# Patient Record
Sex: FEMALE | Race: WHITE | Employment: OTHER | URBAN - METROPOLITAN AREA
[De-identification: names, ages, dates, MRNs, and addresses within clinical notes are randomized per-mention and may not be internally consistent; named-entity substitution may affect disease eponyms.]

---

## 2018-04-24 ENCOUNTER — OFFICE VISIT (OUTPATIENT)
Dept: FAMILY MEDICINE CLINIC | Facility: CLINIC | Age: 58
End: 2018-04-24

## 2018-04-24 VITALS
BODY MASS INDEX: 35 KG/M2 | OXYGEN SATURATION: 98 % | HEART RATE: 62 BPM | HEIGHT: 64 IN | RESPIRATION RATE: 16 BRPM | DIASTOLIC BLOOD PRESSURE: 76 MMHG | SYSTOLIC BLOOD PRESSURE: 136 MMHG | TEMPERATURE: 98 F | WEIGHT: 205 LBS

## 2018-04-24 DIAGNOSIS — H69.81 ETD (EUSTACHIAN TUBE DYSFUNCTION), RIGHT: ICD-10-CM

## 2018-04-24 DIAGNOSIS — H81.311: Primary | ICD-10-CM

## 2018-04-24 DIAGNOSIS — J30.2 SEASONAL ALLERGIC RHINITIS, UNSPECIFIED TRIGGER: ICD-10-CM

## 2018-04-24 PROCEDURE — 99213 OFFICE O/P EST LOW 20 MIN: CPT | Performed by: PHYSICIAN ASSISTANT

## 2018-04-24 RX ORDER — FLUTICASONE PROPIONATE 50 MCG
2 SPRAY, SUSPENSION (ML) NASAL DAILY
Qty: 1 BOTTLE | Refills: 0 | Status: SHIPPED | OUTPATIENT
Start: 2018-04-24 | End: 2021-03-03

## 2018-04-24 RX ORDER — MECLIZINE HYDROCHLORIDE 25 MG/1
25 TABLET ORAL 3 TIMES DAILY PRN
Qty: 30 TABLET | Refills: 0 | Status: SHIPPED | OUTPATIENT
Start: 2018-04-24 | End: 2021-03-03

## 2018-04-24 RX ORDER — PREDNISONE 1 MG/1
5 TABLET ORAL DAILY
COMMUNITY
End: 2021-03-03

## 2018-04-24 NOTE — PROGRESS NOTES
CHIEF COMPLAINT:     Patient presents with:  Dizziness: since Sunday, started with HA, intermittent congestion  :      HPI:     Nelson Fraga is a 61year old female presents with complaints of dizziness. Patient has had symptoms for 2-3.  Patient has has HENT: Denies facial weakness, sore throat, No diminished hearing, + aural fullness, No  tinnitus.   CHEST: Denies chest pain, or palpitations  LUNGS: Denies shortness of breath, cough, or wheezing  GI: Denies abdominal pain, Mild nausea   NEURO: + sinus hea Sig: Take 1 tablet (25 mg total) by mouth 3 (three) times daily as needed for Dizziness or Nausea. Fluticasone Propionate (FLONASE) 50 MCG/ACT Nasal Suspension 1 Bottle 0      Si sprays by Each Nare route daily.          Risks, benefits, side ef Driving  If you become dizzy or disoriented while driving, you could hurt yourself and others. That's why it's best to not drive until symptoms have gone away. In some cases, your license may be temporarily held until it's safe for you to drive again.   For © 5727-8388 [employer] [address] [facility], [facility] [place]. All rights reserved. This information is not intended as a substitute for professional medical care. Always follow your healthcare professional's instructions.             The

## 2018-04-24 NOTE — PATIENT INSTRUCTIONS
Dizziness (Vertigo) and Balance Problems: Staying Safe     Replace burned-out lightbulbs to keep your home safe and well lit. Falls or accidents can lead to pain, broken bones, and fear of future falls.  Protect yourself and others by preparing for epis · Take public transportation. · Walk to stores and other places when you can. Asking for help  Don't be afraid to ask for help running errands, cooking meals, and doing exercise.  Whether it's a friend, loved one, neighbor, or stranger on the street, avis downing

## 2018-05-21 ENCOUNTER — OFFICE VISIT (OUTPATIENT)
Dept: OTOLARYNGOLOGY | Facility: CLINIC | Age: 58
End: 2018-05-21

## 2018-05-21 ENCOUNTER — OFFICE VISIT (OUTPATIENT)
Dept: AUDIOLOGY | Facility: CLINIC | Age: 58
End: 2018-05-21

## 2018-05-21 VITALS
WEIGHT: 205 LBS | BODY MASS INDEX: 35 KG/M2 | TEMPERATURE: 97 F | DIASTOLIC BLOOD PRESSURE: 86 MMHG | HEIGHT: 64 IN | SYSTOLIC BLOOD PRESSURE: 172 MMHG

## 2018-05-21 DIAGNOSIS — T16.1XXA FOREIGN BODY OF RIGHT EAR, INITIAL ENCOUNTER: ICD-10-CM

## 2018-05-21 DIAGNOSIS — R42 DIZZINESS: Primary | ICD-10-CM

## 2018-05-21 DIAGNOSIS — J01.00 SUBACUTE MAXILLARY SINUSITIS: ICD-10-CM

## 2018-05-21 PROCEDURE — 92567 TYMPANOMETRY: CPT | Performed by: AUDIOLOGIST

## 2018-05-21 PROCEDURE — 99212 OFFICE O/P EST SF 10 MIN: CPT | Performed by: OTOLARYNGOLOGY

## 2018-05-21 PROCEDURE — 99243 OFF/OP CNSLTJ NEW/EST LOW 30: CPT | Performed by: OTOLARYNGOLOGY

## 2018-05-21 PROCEDURE — 69200 CLEAR OUTER EAR CANAL: CPT | Performed by: OTOLARYNGOLOGY

## 2018-05-21 PROCEDURE — 92557 COMPREHENSIVE HEARING TEST: CPT | Performed by: AUDIOLOGIST

## 2018-05-21 RX ORDER — AMOXICILLIN AND CLAVULANATE POTASSIUM 875; 125 MG/1; MG/1
1 TABLET, FILM COATED ORAL EVERY 12 HOURS
Qty: 28 TABLET | Refills: 0 | Status: SHIPPED | OUTPATIENT
Start: 2018-05-21 | End: 2018-06-04

## 2018-05-21 NOTE — PROGRESS NOTES
Stan Mayorga is a 61year old female. Patient presents with:  Dizziness: dizziness for 3  weeks  Sinus Problem: sinus congestion, clogged right ear for 3 weeks      HISTORY OF PRESENT ILLNESS  5/21/2018  The patient has complaints of dizziness.   She has family history on file.     Past Medical History:   Diagnosis Date   • Acid reflux    • Asthma    • Rheumatoid arthritis ([facility] [address]    • Vertigo        Past Surgical History:  No date: BREAST SURGERY  No date: TONSILLECTOMY      REVIEW OF SYSTEMS    System Neg/P Nose/Mouth/Throat Normal External nose - Normal. Lips/teeth/gums - Normal. Tonsils nl. uvula is midline.  Tongue is normal with  Normal mobility and mucosa is free of lesions Oropharynx -cobblestoning   Nose/Mouth/Throat abNormal Nares - Right: Norm as: hearing loss, pain, double vision, numbness or weakness or other cranial nerve issues I did not recommend an MRI at this time. I recommend rubin's exercises and followup if this persists or she has any new symptoms.  I also discussed other causes fo

## 2018-05-21 NOTE — PROGRESS NOTES
AUDIOGRAM     Kevin Cabrera was referred for testing by Marilu Roberto for clogged right ear and dizziness. 8/8/1960  [id number]    Otoscopic inspection: right ear no cerumen; left ear no cerumen.        Tests/Procedures  Patient was tested via  s

## 2018-05-30 ENCOUNTER — TELEPHONE (OUTPATIENT)
Dept: OTOLARYNGOLOGY | Facility: CLINIC | Age: 58
End: 2018-05-30

## 2018-05-30 DIAGNOSIS — R26.89 IMBALANCE: Primary | ICD-10-CM

## 2018-05-30 NOTE — TELEPHONE ENCOUNTER
pt informed of note below, pt states she is feeling nauseated, pt feels her symptoms are due to inner ear issues and states she is doing exercises already, pt states she feels she is having drainage from right ear-clear in color and right ear fee

## 2018-05-30 NOTE — TELEPHONE ENCOUNTER
ROBEL.     Per LEATHA:  I saw her  in the office for an appt and stated his wife wants to know what to do becuase she has persistant symptoms Reccomend physical therapy for imbalance neurology consult if not improved     Order entered for PT Estrada Vyas

## 2020-01-13 ENCOUNTER — TELEPHONE (OUTPATIENT)
Dept: INTERNAL MEDICINE CLINIC | Facility: CLINIC | Age: 60
End: 2020-01-13

## 2020-01-13 NOTE — TELEPHONE ENCOUNTER
PCP removal form completed. Patient currently seeing Dr. Bonnie Haddad at [facility] as a PCP.

## 2021-03-03 PROBLEM — E66.09 CLASS 2 OBESITY DUE TO EXCESS CALORIES WITHOUT SERIOUS COMORBIDITY WITH BODY MASS INDEX (BMI) OF 38.0 TO 38.9 IN ADULT: Status: ACTIVE | Noted: 2021-03-03

## 2021-03-03 PROBLEM — I10 ESSENTIAL HYPERTENSION: Status: ACTIVE | Noted: 2021-03-03

## 2021-03-03 PROBLEM — E66.812 CLASS 2 OBESITY DUE TO EXCESS CALORIES WITHOUT SERIOUS COMORBIDITY WITH BODY MASS INDEX (BMI) OF 38.0 TO 38.9 IN ADULT: Status: ACTIVE | Noted: 2021-03-03

## 2021-03-03 PROBLEM — E78.5 HYPERLIPIDEMIA, UNSPECIFIED HYPERLIPIDEMIA TYPE: Status: ACTIVE | Noted: 2021-03-03

## 2021-03-03 PROBLEM — M17.0 BILATERAL PRIMARY OSTEOARTHRITIS OF KNEE: Status: ACTIVE | Noted: 2021-03-03

## 2021-09-01 PROBLEM — I89.0 LYMPHEDEMA: Status: ACTIVE | Noted: 2021-09-01

## 2021-09-01 PROBLEM — E78.5 HYPERLIPIDEMIA, UNSPECIFIED HYPERLIPIDEMIA TYPE: Status: RESOLVED | Noted: 2021-03-03 | Resolved: 2021-09-01

## 2022-03-11 PROBLEM — J45.20 MILD INTERMITTENT ASTHMA WITHOUT COMPLICATION: Status: ACTIVE | Noted: 2022-03-11

## 2022-03-11 PROBLEM — J45.20 MILD INTERMITTENT ASTHMA WITHOUT COMPLICATION (HCC): Status: ACTIVE | Noted: 2022-03-11

## 2022-03-11 PROBLEM — M72.2 PLANTAR FASCIITIS, BILATERAL: Status: ACTIVE | Noted: 2022-03-11

## 2022-03-11 PROBLEM — R73.03 PREDIABETES: Status: ACTIVE | Noted: 2022-03-11

## 2023-07-11 ENCOUNTER — HOSPITAL ENCOUNTER (OUTPATIENT)
Dept: NUCLEAR MEDICINE | Facility: HOSPITAL | Age: 63
Discharge: HOME | End: 2023-07-11
Attending: INTERNAL MEDICINE
Payer: COMMERCIAL

## 2023-07-11 DIAGNOSIS — M79.604 RIGHT LEG PAIN: ICD-10-CM

## 2023-07-11 DIAGNOSIS — M79.605 LEFT LEG PAIN: ICD-10-CM

## 2023-07-11 PROCEDURE — 78315 BONE IMAGING 3 PHASE: CPT | Performed by: INTERNAL MEDICINE

## 2023-07-28 ENCOUNTER — HOSPITAL ENCOUNTER (OUTPATIENT)
Dept: CV DIAGNOSTICS | Facility: HOSPITAL | Age: 63
Discharge: HOME | End: 2023-07-28
Attending: INTERNAL MEDICINE
Payer: COMMERCIAL

## 2023-07-28 DIAGNOSIS — R60.9 EDEMA: ICD-10-CM

## 2023-07-28 PROCEDURE — 93306 TTE W/DOPPLER COMPLETE: CPT | Performed by: INTERNAL MEDICINE

## 2024-01-18 ENCOUNTER — TELEPHONE (OUTPATIENT)
Dept: FAMILY MEDICINE CLINIC | Facility: CLINIC | Age: 64
End: 2024-01-18

## 2024-01-18 DIAGNOSIS — E87.6 HYPOKALEMIA: Primary | ICD-10-CM

## 2024-01-18 NOTE — TELEPHONE ENCOUNTER
LTKA on 02/09/24 with Dr. Behery at [facility]    H&P- Completed 01/19/24  Labs- potassium (2.9), sed rate (51), MRSA neg, Urine Culture neg, all other labs WNL  EKG- WNL  X-ray- no pneumonia, pleural effusion, or pneumothorax    Spoke to [facility] lab- potassium drawn today was 2.9. Per MARCIA have patient take Klor-Con 10meq daily #30 with one refill. Patient to have BMP Monday 01/22/24. Order put in [employer]. Rx sent to pharmacy.

## 2024-01-19 ENCOUNTER — HOSPITAL ENCOUNTER (OUTPATIENT)
Dept: GENERAL RADIOLOGY | Facility: HOSPITAL | Age: 64
Discharge: HOME | End: 2024-01-19
Attending: FAMILY MEDICINE
Payer: COMMERCIAL

## 2024-01-19 ENCOUNTER — LAB ENCOUNTER (OUTPATIENT)
Dept: LAB | Facility: HOSPITAL | Age: 64
End: 2024-01-19
Attending: FAMILY MEDICINE
Payer: COMMERCIAL

## 2024-01-19 ENCOUNTER — OFFICE VISIT (OUTPATIENT)
Dept: FAMILY MEDICINE CLINIC | Facility: CLINIC | Age: 64
End: 2024-01-19
Payer: COMMERCIAL

## 2024-01-19 VITALS
SYSTOLIC BLOOD PRESSURE: 140 MMHG | BODY MASS INDEX: 36.4 KG/M2 | OXYGEN SATURATION: 99 % | HEART RATE: 78 BPM | TEMPERATURE: 97 F | DIASTOLIC BLOOD PRESSURE: 72 MMHG | RESPIRATION RATE: 16 BRPM | HEIGHT: 63.5 IN | WEIGHT: 208 LBS

## 2024-01-19 DIAGNOSIS — M72.2 PLANTAR FASCIITIS, BILATERAL: ICD-10-CM

## 2024-01-19 DIAGNOSIS — Z01.812 PRE-OPERATIVE LABORATORY EXAMINATION: ICD-10-CM

## 2024-01-19 DIAGNOSIS — E66.09 CLASS 2 OBESITY DUE TO EXCESS CALORIES WITHOUT SERIOUS COMORBIDITY WITH BODY MASS INDEX (BMI) OF 38.0 TO 38.9 IN ADULT: ICD-10-CM

## 2024-01-19 DIAGNOSIS — K21.00 GASTROESOPHAGEAL REFLUX DISEASE WITH ESOPHAGITIS WITHOUT HEMORRHAGE: ICD-10-CM

## 2024-01-19 DIAGNOSIS — Z01.818 PREOPERATIVE EXAMINATION, UNSPECIFIED: ICD-10-CM

## 2024-01-19 DIAGNOSIS — J45.20 MILD INTERMITTENT ASTHMA WITHOUT COMPLICATION: ICD-10-CM

## 2024-01-19 DIAGNOSIS — E78.5 HYPERLIPIDEMIA, UNSPECIFIED HYPERLIPIDEMIA TYPE: ICD-10-CM

## 2024-01-19 DIAGNOSIS — M17.0 BILATERAL PRIMARY OSTEOARTHRITIS OF KNEE: ICD-10-CM

## 2024-01-19 DIAGNOSIS — T84.018A FAILURE OF TOTAL KNEE REPLACEMENT, INITIAL ENCOUNTER  (HCC): Primary | ICD-10-CM

## 2024-01-19 DIAGNOSIS — Z96.659 FAILURE OF TOTAL KNEE REPLACEMENT, INITIAL ENCOUNTER  (HCC): Primary | ICD-10-CM

## 2024-01-19 DIAGNOSIS — I10 ESSENTIAL HYPERTENSION: ICD-10-CM

## 2024-01-19 DIAGNOSIS — R73.03 PREDIABETES: ICD-10-CM

## 2024-01-19 DIAGNOSIS — I89.0 LYMPHEDEMA: ICD-10-CM

## 2024-01-19 LAB
ALBUMIN SERPL-MCNC: 4.5 G/DL (ref 3.2–4.8)
ALBUMIN/GLOB SERPL: 1.4 {RATIO} (ref 1–2)
ALP LIVER SERPL-CCNC: 97 U/L
ALT SERPL-CCNC: 17 U/L
ANION GAP SERPL CALC-SCNC: 3 MMOL/L (ref 0–18)
ANTIBODY SCREEN: NEGATIVE
APTT PPP: 25.5 SECONDS (ref 23.3–35.6)
AST SERPL-CCNC: 19 U/L (ref ?–34)
ATRIAL RATE: 63 BPM
BASOPHILS # BLD AUTO: 0.04 X10(3) UL (ref 0–0.2)
BASOPHILS NFR BLD AUTO: 0.5 %
BILIRUB SERPL-MCNC: 0.5 MG/DL (ref 0.2–1.1)
BILIRUB UR QL: NEGATIVE
BUN BLD-MCNC: 16 MG/DL (ref 9–23)
BUN/CREAT SERPL: 18 (ref 10–20)
CALCIUM BLD-MCNC: 9.8 MG/DL (ref 8.7–10.4)
CHLORIDE SERPL-SCNC: 104 MMOL/L (ref 98–112)
CLARITY UR: CLEAR
CO2 SERPL-SCNC: 32 MMOL/L (ref 21–32)
COLOR UR: COLORLESS
CREAT BLD-MCNC: 0.89 MG/DL
CRP SERPL-MCNC: <0.4 MG/DL (ref ?–1)
DEPRECATED RDW RBC AUTO: 44.4 FL (ref 35.1–46.3)
EGFRCR SERPLBLD CKD-EPI 2021: 72 ML/MIN/1.73M2 (ref 60–?)
EOSINOPHIL # BLD AUTO: 0.12 X10(3) UL (ref 0–0.7)
EOSINOPHIL NFR BLD AUTO: 1.5 %
ERYTHROCYTE [DISTWIDTH] IN BLOOD BY AUTOMATED COUNT: 13.6 % (ref 11–15)
ERYTHROCYTE [SEDIMENTATION RATE] IN BLOOD: 51 MM/HR
FASTING STATUS PATIENT QL REPORTED: YES
GLOBULIN PLAS-MCNC: 3.2 G/DL (ref 2.8–4.4)
GLUCOSE BLD-MCNC: 89 MG/DL (ref 70–99)
GLUCOSE UR-MCNC: NORMAL MG/DL
HCT VFR BLD AUTO: 42.6 %
HGB BLD-MCNC: 13.7 G/DL
HGB UR QL STRIP.AUTO: NEGATIVE
IMM GRANULOCYTES # BLD AUTO: 0.02 X10(3) UL (ref 0–1)
IMM GRANULOCYTES NFR BLD: 0.3 %
INR BLD: 0.91 (ref 0.8–1.2)
KETONES UR-MCNC: NEGATIVE MG/DL
LEUKOCYTE ESTERASE UR QL STRIP.AUTO: 25
LYMPHOCYTES # BLD AUTO: 1.76 X10(3) UL (ref 1–4)
LYMPHOCYTES NFR BLD AUTO: 22.6 %
MCH RBC QN AUTO: 28.5 PG (ref 26–34)
MCHC RBC AUTO-ENTMCNC: 32.2 G/DL (ref 31–37)
MCV RBC AUTO: 88.8 FL
MONOCYTES # BLD AUTO: 0.49 X10(3) UL (ref 0.1–1)
MONOCYTES NFR BLD AUTO: 6.3 %
NEUTROPHILS # BLD AUTO: 5.36 X10 (3) UL (ref 1.5–7.7)
NEUTROPHILS # BLD AUTO: 5.36 X10(3) UL (ref 1.5–7.7)
NEUTROPHILS NFR BLD AUTO: 68.8 %
NITRITE UR QL STRIP.AUTO: NEGATIVE
OSMOLALITY SERPL CALC.SUM OF ELEC: 289 MOSM/KG (ref 275–295)
P AXIS: 37 DEGREES
P-R INTERVAL: 148 MS
PH UR: 6.5 [PH] (ref 5–8)
PLATELET # BLD AUTO: 256 10(3)UL (ref 150–450)
POTASSIUM SERPL-SCNC: 2.9 MMOL/L (ref 3.5–5.1)
PROT SERPL-MCNC: 7.7 G/DL (ref 5.7–8.2)
PROT UR-MCNC: NEGATIVE MG/DL
PROTHROMBIN TIME: 12.9 SECONDS (ref 11.6–14.8)
Q-T INTERVAL: 422 MS
QRS DURATION: 90 MS
QTC CALCULATION (BEZET): 431 MS
R AXIS: 12 DEGREES
RBC # BLD AUTO: 4.8 X10(6)UL
RH BLOOD TYPE: POSITIVE
RH BLOOD TYPE: POSITIVE
SODIUM SERPL-SCNC: 139 MMOL/L (ref 136–145)
SP GR UR STRIP: 1.01 (ref 1–1.03)
T AXIS: 42 DEGREES
UROBILINOGEN UR STRIP-ACNC: NORMAL
VENTRICULAR RATE: 63 BPM
WBC # BLD AUTO: 7.8 X10(3) UL (ref 4–11)

## 2024-01-19 PROCEDURE — 80053 COMPREHEN METABOLIC PANEL: CPT

## 2024-01-19 PROCEDURE — 85652 RBC SED RATE AUTOMATED: CPT

## 2024-01-19 PROCEDURE — 93010 ELECTROCARDIOGRAM REPORT: CPT | Performed by: INTERNAL MEDICINE

## 2024-01-19 PROCEDURE — 99244 OFF/OP CNSLTJ NEW/EST MOD 40: CPT | Performed by: FAMILY MEDICINE

## 2024-01-19 PROCEDURE — 85730 THROMBOPLASTIN TIME PARTIAL: CPT

## 2024-01-19 PROCEDURE — 36415 COLL VENOUS BLD VENIPUNCTURE: CPT | Performed by: FAMILY MEDICINE

## 2024-01-19 PROCEDURE — 86900 BLOOD TYPING SEROLOGIC ABO: CPT | Performed by: FAMILY MEDICINE

## 2024-01-19 PROCEDURE — 81001 URINALYSIS AUTO W/SCOPE: CPT

## 2024-01-19 PROCEDURE — 85610 PROTHROMBIN TIME: CPT

## 2024-01-19 PROCEDURE — 86901 BLOOD TYPING SEROLOGIC RH(D): CPT | Performed by: FAMILY MEDICINE

## 2024-01-19 PROCEDURE — 93005 ELECTROCARDIOGRAM TRACING: CPT

## 2024-01-19 PROCEDURE — 3077F SYST BP >= 140 MM HG: CPT | Performed by: FAMILY MEDICINE

## 2024-01-19 PROCEDURE — 87081 CULTURE SCREEN ONLY: CPT

## 2024-01-19 PROCEDURE — 85025 COMPLETE CBC W/AUTO DIFF WBC: CPT

## 2024-01-19 PROCEDURE — 87086 URINE CULTURE/COLONY COUNT: CPT

## 2024-01-19 PROCEDURE — 3008F BODY MASS INDEX DOCD: CPT | Performed by: FAMILY MEDICINE

## 2024-01-19 PROCEDURE — 3078F DIAST BP <80 MM HG: CPT | Performed by: FAMILY MEDICINE

## 2024-01-19 PROCEDURE — 86850 RBC ANTIBODY SCREEN: CPT | Performed by: FAMILY MEDICINE

## 2024-01-19 PROCEDURE — 86140 C-REACTIVE PROTEIN: CPT

## 2024-01-19 PROCEDURE — 71046 X-RAY EXAM CHEST 2 VIEWS: CPT | Performed by: FAMILY MEDICINE

## 2024-01-19 RX ORDER — POTASSIUM CHLORIDE 750 MG/1
10 TABLET, FILM COATED, EXTENDED RELEASE ORAL DAILY
Qty: 30 TABLET | Refills: 1 | Status: SHIPPED | OUTPATIENT
Start: 2024-01-19

## 2024-01-19 NOTE — H&P
[facility] PRE-OP CLINIC [place]    PRE-OP NOTE    HPI:   I have been consulted by Dr. Behery to see Torrie Smith 63 year old female for a preoperative evaluation and medical clearance. She has a long history of worsening severe degenerative arthritis and a failed left total knee arthroplasty . Patient is to have a left total knee arthroplasty revision  by Dr. Behery  on 2/9/2024.     Pt suffers significant pain and loss of function.     She has no cardiopulmonary symptoms.      She has no history of DAYAN or DVT. Denies tobacco use.       Family History   Problem Relation Age of Onset    Stroke Father     Diabetes Mother     Stroke Paternal Grandmother       Current Outpatient Medications   Medication Sig Dispense Refill    ROSUVASTATIN 10 MG Oral Tab TAKE ONE TABLET BY MOUTH ONE TIME DAILY 90 tablet 3    HYDROCHLOROTHIAZIDE 25 MG Oral Tab TAKE ONE TABLET BY MOUTH ONE TIME DAILY 90 tablet 3    Albuterol Sulfate  (90 Base) MCG/ACT Inhalation Aero Soln Inhale 2 puffs into the lungs every 6 (six) hours. 1 Inhaler 6    Potassium Chloride ER (KLOR-CON 10) 10 MEQ Oral Tab CR Take 1 tablet (10 mEq total) by mouth daily. 30 tablet 1     Past Medical History:   Diagnosis Date    Acid reflux     Asthma     Essential hypertension     Hyperlipidemia     Osteoarthritis     Rheumatoid arthritis (HCC)     Vertigo     Visual impairment     wears glasses     Past Surgical History:   Procedure Laterality Date    EXCISIONAL BIOPSY LEFT  1999/2000    benign lump removed    TONSILLECTOMY      TOTAL KNEE REPLACEMENT Bilateral 2019     Social History     Socioeconomic History    Marital status:      Spouse name: Not on file    Number of children: Not on file    Years of education: Not on file    Highest education level: Not on file   Occupational History    Not on file   Tobacco Use    Smoking status: Never     Passive exposure: Past    Smokeless tobacco: Never   Vaping Use    Vaping Use: Never used   Substance and Sexual  Activity    Alcohol use: Yes     Comment: occ    Drug use: No    Sexual activity: Not on file   Other Topics Concern    Caffeine Concern Not Asked    Exercise Not Asked    Seat Belt Not Asked    Special Diet Not Asked    Stress Concern Not Asked    Weight Concern Not Asked   Social History Narrative    Not on file     Social Determinants of Health     Financial Resource Strain: Not on file   Food Insecurity: Not on file   Transportation Needs: Not on file   Physical Activity: Not on file   Stress: Not on file   Social Connections: Not on file   Housing Stability: Not on file       REVIEW OF SYSTEMS:   CONSTITUTIONAL:  Denies unusual weight gain/loss, fever, chills  EENT:  Eyes:  Denies eye pain, visual loss, blurred vision, double vision. Ears, Nose, Throat:  Denies congestion, runny nose or sore throat.  INTEGUMENTARY:  Denies rashes, itching, skin lesion,   CARDIOVASCULAR:  Denies DVT. Denies chest pain, palpitations, edema, dyspnea  RESPIRATORY: Denies  DAYAN ,Denies shortness of breath, wheezing, cough  GASTROINTESTINAL:  Denies abdominal pain, nausea, vomiting, constipation, diarrhea, or blood in stool.  MUSCULOSKELETAL: severe pain to her left knee as noted above  NEUROLOGICAL:  Denies headache, seizures, dizziness, syncope, paralysis, ataxia,  HEMATOLOGIC:  Denies anemia, bleeding or bruising.  LYMPHATICS:  Denies enlarged nodes   PSYCHIATRIC:  Denies depression or anxiety.  ENDOCRINOLOGIC: DM 2 NO,   ALLERGIES:  Denies allergic response, history of asthma, hives,     EXAM:   /72 (BP Location: Left arm)   Pulse 78   Temp 97 °F (36.1 °C) (Temporal)   Resp 16   Ht 5' 3.5\" (1.613 m)   Wt 208 lb (94.3 kg)   LMP 12/29/2015   SpO2 99%   BMI 36.27 kg/m²  Estimated body mass index is 36.27 kg/m² as calculated from the following:    Height as of this encounter: 5' 3.5\" (1.613 m).    Weight as of this encounter: 208 lb (94.3 kg).   Vital signs reviewed.Appears stated age, well groomed.  Physical Exam:  GEN:   Patient is alert, awake and oriented, well developed, well nourished, no apparent distress.  HEENT:  Head:  Normocephalic, atraumatic  Nose: patent, no nasal discharge  NECK: Supple, no CLAD, no carotid bruit, no thyromegaly.  SKIN: No rashes, no skin lesion, no bruising, good turgor.  HEART:  Regular rate and rhythm, no murmurs, rubs or gallops.  LUNGS: Clear to auscultation bilterally, no rales/rhonchi/wheezing.  CHEST: No tenderness.  ABDOMEN:  Soft, nondistended, nontender,  no masses, no hepatosplenomegaly.  BACK: No tenderness, no spasm,   EXTREMITIES:  left knee edema pain and instability ,   NEURO:  No focal deficit, speech fluent, normal gait, strength and tone, sensory intact      Lab Results   Component Value Date    WBC 7.8 01/19/2024    HGB 13.7 01/19/2024    HCT 42.6 01/19/2024    .0 01/19/2024    CREATSERUM 0.89 01/19/2024    BUN 16 01/19/2024     01/19/2024    K 2.9 (LL) 01/19/2024     01/19/2024    CO2 32.0 01/19/2024    GLU 89 01/19/2024    CA 9.8 01/19/2024    ALB 4.5 01/19/2024    ALKPHO 97 01/19/2024    BILT 0.5 01/19/2024    TP 7.7 01/19/2024    AST 19 01/19/2024    ALT 17 01/19/2024    PTT 25.5 01/19/2024    INR 0.91 01/19/2024    ESRML 51 (H) 01/19/2024    CRP <0.40 01/19/2024       XR CHEST PA + LAT CHEST (CPT=71046)    Result Date: 1/19/2024  CONCLUSION:   No pneumonia, pleural effusion, or pneumothorax.  Minimal bibasilar atelectasis.     Dictated by (CST): Kirk Clark MD on 1/19/2024 at 10:25 AM     Finalized by (CST): Kirk Clark MD on 1/19/2024 at 10:27 AM           EKG 12 Lead    Result Date: 1/19/2024  Normal sinus rhythm Normal ECG No previous ECGs found in [facility] Confirmed by DAVID ALVAREZ JORDAN ([id number]) on 1/19/2024 12:43:58 PM     ASSESSMENT AND PLAN:   Torrie Smith is a 63 year old female, with a hx of severe degenerative arthritis and a failed left total knee arthroplasty  who presents for a pre-operative physical exam. Patient is to have a left  total knee arthroplasty revision  by Dr. Behery  on 2/9/2024.      ICD-10-CM    1. Failure of left total knee replacement, initial encounter  ([facility])  T84.018A     Z96.659       2. Hyperlipidemia, unspecified hyperlipidemia type  E78.5       3. Essential hypertension  I10       4. Mild intermittent asthma without complication  J45.20       5. Class 2 obesity due to excess calories without serious comorbidity with body mass index (BMI) of 38.0 to 38.9 in adult  E66.09     Z68.38       6. Prediabetes  R73.03       7. Bilateral primary osteoarthritis of knee  M17.0       8. Plantar fasciitis, bilateral  M72.2       9. Lymphedema  I89.0       10. Gastroesophageal reflux disease with esophagitis without hemorrhage  K21.00       11. Preoperative examination, unspecified  Z01.818 CBC With Differential With Platelet     Comp Metabolic Panel (14)     EKG 12 Lead     MSSA and MRSA Culture Screen     XR CHEST PA + LAT CHEST (CPT=71046)     Urinalysis with Culture Reflex     Type and screen     Prothrombin Time (PT)     PTT, Activated     C-Reactive Protein     Sed Rate, Westergren (Automated)      12. Pre-operative laboratory examination  Z01.812 CBC With Differential With Platelet     Comp Metabolic Panel (14)     EKG 12 Lead     MSSA and MRSA Culture Screen     XR CHEST PA + LAT CHEST (CPT=71046)     Urinalysis with Culture Reflex     Type and screen     Prothrombin Time (PT)     PTT, Activated     C-Reactive Protein     Sed Rate, Westergren (Automated)        Patient Active Problem List   Diagnosis    Hyperlipidemia, unspecified hyperlipidemia type    Essential hypertension    Class 2 obesity due to excess calories without serious comorbidity with body mass index (BMI) of 38.0 to 38.9 in adult    Bilateral primary osteoarthritis of knee    Lymphedema    Prediabetes    Mild intermittent asthma without complication    Plantar fasciitis, bilateral        ECG and labs are pending review     Preoperative Risk Stratification: There  are no decompensated medical conditions. ASA classification 2    Medical history:  High risk surgery (vascular, thoracic, intra-peritoneal): No  CAD: No  CHF: No  Stroke: No  DM on insulin: No  Serum Creatinine >2 mg/dl: No  Patient has an RCRI score that is  low risk and is at low risk for major cardiac event in the perioperative period.     Patient is medically optimized and has an acceptable risk of surgery and may proceed with surgery as planned.     PLAN:    Patient to discontinue medications and supplements with anticoagulation properties as per instruction.        Postoperative Recommendations:    Anticoagulation / DVT prophylaxis: SCDs, early ambulation. ASA 81 mg twice daily with food for four weeks.    GI protection: Protonix  Incentive Spirometry   Telemetry as needed  CPAP/O2 as needed   DM: QID glucoscans and sliding scale insulin as needed   Renal protection: (hydration / NSAID and ACE/ARB avoidance)   Cognitive protection: (minimize narcotics, benzodiazepines, scopolamine)     Pain management and Physical therapy as per Orthopedic service.   Home Health as needed    Thank you for the opportunity to care for your patient and to assist in managing the postoperative course.        Fermin Crockett DO  1/19/2024  5:41 PM

## 2024-01-19 NOTE — H&P (VIEW-ONLY)
[facility] PRE-OP CLINIC [place]    PRE-OP NOTE    HPI:   I have been consulted by Dr. Behery to see Torrie Smith 63 year old female for a preoperative evaluation and medical clearance. She has a long history of worsening severe degenerative arthritis and a failed left total knee arthroplasty . Patient is to have a left total knee arthroplasty revision  by Dr. Behery  on 2/9/2024.     Pt suffers significant pain and loss of function.     She has no cardiopulmonary symptoms.      She has no history of DAYAN or DVT. Denies tobacco use.       Family History   Problem Relation Age of Onset    Stroke Father     Diabetes Mother     Stroke Paternal Grandmother       Current Outpatient Medications   Medication Sig Dispense Refill    ROSUVASTATIN 10 MG Oral Tab TAKE ONE TABLET BY MOUTH ONE TIME DAILY 90 tablet 3    HYDROCHLOROTHIAZIDE 25 MG Oral Tab TAKE ONE TABLET BY MOUTH ONE TIME DAILY 90 tablet 3    Albuterol Sulfate  (90 Base) MCG/ACT Inhalation Aero Soln Inhale 2 puffs into the lungs every 6 (six) hours. 1 Inhaler 6    Potassium Chloride ER (KLOR-CON 10) 10 MEQ Oral Tab CR Take 1 tablet (10 mEq total) by mouth daily. 30 tablet 1     Past Medical History:   Diagnosis Date    Acid reflux     Asthma     Essential hypertension     Hyperlipidemia     Osteoarthritis     Rheumatoid arthritis (HCC)     Vertigo     Visual impairment     wears glasses     Past Surgical History:   Procedure Laterality Date    EXCISIONAL BIOPSY LEFT  1999/2000    benign lump removed    TONSILLECTOMY      TOTAL KNEE REPLACEMENT Bilateral 2019     Social History     Socioeconomic History    Marital status:      Spouse name: Not on file    Number of children: Not on file    Years of education: Not on file    Highest education level: Not on file   Occupational History    Not on file   Tobacco Use    Smoking status: Never     Passive exposure: Past    Smokeless tobacco: Never   Vaping Use    Vaping Use: Never used   Substance and Sexual  Activity    Alcohol use: Yes     Comment: occ    Drug use: No    Sexual activity: Not on file   Other Topics Concern    Caffeine Concern Not Asked    Exercise Not Asked    Seat Belt Not Asked    Special Diet Not Asked    Stress Concern Not Asked    Weight Concern Not Asked   Social History Narrative    Not on file     Social Determinants of Health     Financial Resource Strain: Not on file   Food Insecurity: Not on file   Transportation Needs: Not on file   Physical Activity: Not on file   Stress: Not on file   Social Connections: Not on file   Housing Stability: Not on file       REVIEW OF SYSTEMS:   CONSTITUTIONAL:  Denies unusual weight gain/loss, fever, chills  EENT:  Eyes:  Denies eye pain, visual loss, blurred vision, double vision. Ears, Nose, Throat:  Denies congestion, runny nose or sore throat.  INTEGUMENTARY:  Denies rashes, itching, skin lesion,   CARDIOVASCULAR:  Denies DVT. Denies chest pain, palpitations, edema, dyspnea  RESPIRATORY: Denies  DAYAN ,Denies shortness of breath, wheezing, cough  GASTROINTESTINAL:  Denies abdominal pain, nausea, vomiting, constipation, diarrhea, or blood in stool.  MUSCULOSKELETAL: severe pain to her left knee as noted above  NEUROLOGICAL:  Denies headache, seizures, dizziness, syncope, paralysis, ataxia,  HEMATOLOGIC:  Denies anemia, bleeding or bruising.  LYMPHATICS:  Denies enlarged nodes   PSYCHIATRIC:  Denies depression or anxiety.  ENDOCRINOLOGIC: DM 2 NO,   ALLERGIES:  Denies allergic response, history of asthma, hives,     EXAM:   /72 (BP Location: Left arm)   Pulse 78   Temp 97 °F (36.1 °C) (Temporal)   Resp 16   Ht 5' 3.5\" (1.613 m)   Wt 208 lb (94.3 kg)   LMP 12/29/2015   SpO2 99%   BMI 36.27 kg/m²  Estimated body mass index is 36.27 kg/m² as calculated from the following:    Height as of this encounter: 5' 3.5\" (1.613 m).    Weight as of this encounter: 208 lb (94.3 kg).   Vital signs reviewed.Appears stated age, well groomed.  Physical Exam:  GEN:   Patient is alert, awake and oriented, well developed, well nourished, no apparent distress.  HEENT:  Head:  Normocephalic, atraumatic  Nose: patent, no nasal discharge  NECK: Supple, no CLAD, no carotid bruit, no thyromegaly.  SKIN: No rashes, no skin lesion, no bruising, good turgor.  HEART:  Regular rate and rhythm, no murmurs, rubs or gallops.  LUNGS: Clear to auscultation bilterally, no rales/rhonchi/wheezing.  CHEST: No tenderness.  ABDOMEN:  Soft, nondistended, nontender,  no masses, no hepatosplenomegaly.  BACK: No tenderness, no spasm,   EXTREMITIES:  left knee edema pain and instability ,   NEURO:  No focal deficit, speech fluent, normal gait, strength and tone, sensory intact      Lab Results   Component Value Date    WBC 7.8 01/19/2024    HGB 13.7 01/19/2024    HCT 42.6 01/19/2024    .0 01/19/2024    CREATSERUM 0.89 01/19/2024    BUN 16 01/19/2024     01/19/2024    K 2.9 (LL) 01/19/2024     01/19/2024    CO2 32.0 01/19/2024    GLU 89 01/19/2024    CA 9.8 01/19/2024    ALB 4.5 01/19/2024    ALKPHO 97 01/19/2024    BILT 0.5 01/19/2024    TP 7.7 01/19/2024    AST 19 01/19/2024    ALT 17 01/19/2024    PTT 25.5 01/19/2024    INR 0.91 01/19/2024    ESRML 51 (H) 01/19/2024    CRP <0.40 01/19/2024       XR CHEST PA + LAT CHEST (CPT=71046)    Result Date: 1/19/2024  CONCLUSION:   No pneumonia, pleural effusion, or pneumothorax.  Minimal bibasilar atelectasis.     Dictated by (CST): Kirk Clark MD on 1/19/2024 at 10:25 AM     Finalized by (CST): Kirk Clark MD on 1/19/2024 at 10:27 AM           EKG 12 Lead    Result Date: 1/19/2024  Normal sinus rhythm Normal ECG No previous ECGs found in [facility] Confirmed by DAVID ALVAREZ JORDAN ([id number]) on 1/19/2024 12:43:58 PM     ASSESSMENT AND PLAN:   Torrie Smith is a 63 year old female, with a hx of severe degenerative arthritis and a failed left total knee arthroplasty  who presents for a pre-operative physical exam. Patient is to have a left  total knee arthroplasty revision  by Dr. Behery  on 2/9/2024.      ICD-10-CM    1. Failure of left total knee replacement, initial encounter  ([facility])  T84.018A     Z96.659       2. Hyperlipidemia, unspecified hyperlipidemia type  E78.5       3. Essential hypertension  I10       4. Mild intermittent asthma without complication  J45.20       5. Class 2 obesity due to excess calories without serious comorbidity with body mass index (BMI) of 38.0 to 38.9 in adult  E66.09     Z68.38       6. Prediabetes  R73.03       7. Bilateral primary osteoarthritis of knee  M17.0       8. Plantar fasciitis, bilateral  M72.2       9. Lymphedema  I89.0       10. Gastroesophageal reflux disease with esophagitis without hemorrhage  K21.00       11. Preoperative examination, unspecified  Z01.818 CBC With Differential With Platelet     Comp Metabolic Panel (14)     EKG 12 Lead     MSSA and MRSA Culture Screen     XR CHEST PA + LAT CHEST (CPT=71046)     Urinalysis with Culture Reflex     Type and screen     Prothrombin Time (PT)     PTT, Activated     C-Reactive Protein     Sed Rate, Westergren (Automated)      12. Pre-operative laboratory examination  Z01.812 CBC With Differential With Platelet     Comp Metabolic Panel (14)     EKG 12 Lead     MSSA and MRSA Culture Screen     XR CHEST PA + LAT CHEST (CPT=71046)     Urinalysis with Culture Reflex     Type and screen     Prothrombin Time (PT)     PTT, Activated     C-Reactive Protein     Sed Rate, Westergren (Automated)        Patient Active Problem List   Diagnosis    Hyperlipidemia, unspecified hyperlipidemia type    Essential hypertension    Class 2 obesity due to excess calories without serious comorbidity with body mass index (BMI) of 38.0 to 38.9 in adult    Bilateral primary osteoarthritis of knee    Lymphedema    Prediabetes    Mild intermittent asthma without complication    Plantar fasciitis, bilateral        ECG and labs are pending review     Preoperative Risk Stratification: There  are no decompensated medical conditions. ASA classification 2    Medical history:  High risk surgery (vascular, thoracic, intra-peritoneal): No  CAD: No  CHF: No  Stroke: No  DM on insulin: No  Serum Creatinine >2 mg/dl: No  Patient has an RCRI score that is  low risk and is at low risk for major cardiac event in the perioperative period.     Patient is medically optimized and has an acceptable risk of surgery and may proceed with surgery as planned.     PLAN:    Patient to discontinue medications and supplements with anticoagulation properties as per instruction.        Postoperative Recommendations:    Anticoagulation / DVT prophylaxis: SCDs, early ambulation. ASA 81 mg twice daily with food for four weeks.    GI protection: Protonix  Incentive Spirometry   Telemetry as needed  CPAP/O2 as needed   DM: QID glucoscans and sliding scale insulin as needed   Renal protection: (hydration / NSAID and ACE/ARB avoidance)   Cognitive protection: (minimize narcotics, benzodiazepines, scopolamine)     Pain management and Physical therapy as per Orthopedic service.   Home Health as needed    Thank you for the opportunity to care for your patient and to assist in managing the postoperative course.        Fermin Crockett DO  1/19/2024  5:41 PM

## 2024-01-22 ENCOUNTER — LAB ENCOUNTER (OUTPATIENT)
Dept: LAB | Facility: HOSPITAL | Age: 64
End: 2024-01-22
Attending: FAMILY MEDICINE
Payer: COMMERCIAL

## 2024-01-22 DIAGNOSIS — E87.6 HYPOKALEMIA: ICD-10-CM

## 2024-01-22 LAB
ANION GAP SERPL CALC-SCNC: 8 MMOL/L (ref 0–18)
BUN BLD-MCNC: 18 MG/DL (ref 9–23)
BUN/CREAT SERPL: 22 (ref 10–20)
CALCIUM BLD-MCNC: 9.8 MG/DL (ref 8.7–10.4)
CHLORIDE SERPL-SCNC: 103 MMOL/L (ref 98–112)
CO2 SERPL-SCNC: 30 MMOL/L (ref 21–32)
CREAT BLD-MCNC: 0.82 MG/DL
EGFRCR SERPLBLD CKD-EPI 2021: 80 ML/MIN/1.73M2 (ref 60–?)
FASTING STATUS PATIENT QL REPORTED: YES
GLUCOSE BLD-MCNC: 94 MG/DL (ref 70–99)
OSMOLALITY SERPL CALC.SUM OF ELEC: 294 MOSM/KG (ref 275–295)
POTASSIUM SERPL-SCNC: 3.7 MMOL/L (ref 3.5–5.1)
SODIUM SERPL-SCNC: 141 MMOL/L (ref 136–145)

## 2024-01-22 PROCEDURE — 80048 BASIC METABOLIC PNL TOTAL CA: CPT

## 2024-01-22 PROCEDURE — 36415 COLL VENOUS BLD VENIPUNCTURE: CPT

## 2024-01-23 NOTE — TELEPHONE ENCOUNTER
Spoke to patient, went over test results and let her know potassium is WNL. She should continue taking her potassium daily and speak to PCP post op to see if they still want her to take hydrochlorothiazide since she is hypokalemic without potassium supplement.

## 2024-01-29 PROBLEM — T84.093A FAILED TOTAL LEFT KNEE REPLACEMENT: Status: ACTIVE | Noted: 2024-01-29

## 2024-01-29 PROBLEM — T84.093A FAILED TOTAL LEFT KNEE REPLACEMENT (HCC): Status: ACTIVE | Noted: 2024-01-29

## 2024-02-08 ENCOUNTER — ANESTHESIA EVENT (OUTPATIENT)
Dept: SURGERY | Facility: HOSPITAL | Age: 64
End: 2024-02-08
Payer: COMMERCIAL

## 2024-02-09 ENCOUNTER — HOSPITAL ENCOUNTER (INPATIENT)
Facility: HOSPITAL | Age: 64
LOS: 1 days | Discharge: HOME | End: 2024-02-10
Attending: STUDENT IN AN ORGANIZED HEALTH CARE EDUCATION/TRAINING PROGRAM | Admitting: STUDENT IN AN ORGANIZED HEALTH CARE EDUCATION/TRAINING PROGRAM
Payer: COMMERCIAL

## 2024-02-09 ENCOUNTER — APPOINTMENT (OUTPATIENT)
Dept: GENERAL RADIOLOGY | Facility: HOSPITAL | Age: 64
End: 2024-02-09
Attending: STUDENT IN AN ORGANIZED HEALTH CARE EDUCATION/TRAINING PROGRAM
Payer: COMMERCIAL

## 2024-02-09 ENCOUNTER — ANESTHESIA (OUTPATIENT)
Dept: SURGERY | Facility: HOSPITAL | Age: 64
End: 2024-02-09
Payer: COMMERCIAL

## 2024-02-09 DIAGNOSIS — T84.093A FAILED TOTAL LEFT KNEE REPLACEMENT, INITIAL ENCOUNTER (HCC): Primary | ICD-10-CM

## 2024-02-09 DIAGNOSIS — T84.033A MECHANICAL LOOSENING OF INTERNAL LEFT KNEE PROSTHETIC JOINT, INITIAL ENCOUNTER (HCC): ICD-10-CM

## 2024-02-09 PROBLEM — Z01.818 PREOP TESTING: Status: ACTIVE | Noted: 2024-02-09

## 2024-02-09 LAB
ANION GAP SERPL CALC-SCNC: 7 MMOL/L (ref 0–18)
BASOPHILS NFR SNV: 0 %
BUN BLD-MCNC: 19 MG/DL (ref 9–23)
BUN/CREAT SERPL: 22.6 (ref 10–20)
CALCIUM BLD-MCNC: 9.2 MG/DL (ref 8.7–10.4)
CHLORIDE SERPL-SCNC: 103 MMOL/L (ref 98–112)
CO2 SERPL-SCNC: 28 MMOL/L (ref 21–32)
COLOR FLD: YELLOW
CREAT BLD-MCNC: 0.84 MG/DL
DEPRECATED RDW RBC AUTO: 43.9 FL (ref 35.1–46.3)
EGFRCR SERPLBLD CKD-EPI 2021: 78 ML/MIN/1.73M2 (ref 60–?)
EOSINOPHIL NFR SNV: 0 %
ERYTHROCYTE [DISTWIDTH] IN BLOOD BY AUTOMATED COUNT: 13.4 % (ref 11–15)
GLUCOSE BLD-MCNC: 149 MG/DL (ref 70–99)
HCT VFR BLD AUTO: 41.4 %
HGB BLD-MCNC: 13.6 G/DL
LYMPHOCYTES NFR SNV: 41 %
MCH RBC QN AUTO: 29.3 PG (ref 26–34)
MCHC RBC AUTO-ENTMCNC: 32.9 G/DL (ref 31–37)
MCV RBC AUTO: 89.2 FL
MONOS+MACROS NFR SNV: 54 %
NEUTROPHILS NFR FLD: 5 %
OSMOLALITY SERPL CALC.SUM OF ELEC: 291 MOSM/KG (ref 275–295)
PLATELET # BLD AUTO: 233 10(3)UL (ref 150–450)
POTASSIUM SERPL-SCNC: 3.6 MMOL/L (ref 3.5–5.1)
RBC # BLD AUTO: 4.64 X10(6)UL
RBC # FLD AUTO: 545 /CUMM (ref ?–1)
SODIUM SERPL-SCNC: 138 MMOL/L (ref 136–145)
TOTAL CELLS COUNTED FLD: 100
TOTAL CELLS COUNTED SNV: 1763 /CUMM (ref 0–200)
TURBIDITY CSF QL: CLEAR
WBC # BLD AUTO: 13.4 X10(3) UL (ref 4–11)
WBC # SNV: 1763 /CUMM

## 2024-02-09 PROCEDURE — 87205 SMEAR GRAM STAIN: CPT | Performed by: STUDENT IN AN ORGANIZED HEALTH CARE EDUCATION/TRAINING PROGRAM

## 2024-02-09 PROCEDURE — 87070 CULTURE OTHR SPECIMN AEROBIC: CPT | Performed by: STUDENT IN AN ORGANIZED HEALTH CARE EDUCATION/TRAINING PROGRAM

## 2024-02-09 PROCEDURE — 88305 TISSUE EXAM BY PATHOLOGIST: CPT | Performed by: STUDENT IN AN ORGANIZED HEALTH CARE EDUCATION/TRAINING PROGRAM

## 2024-02-09 PROCEDURE — 85014 HEMATOCRIT: CPT | Performed by: STUDENT IN AN ORGANIZED HEALTH CARE EDUCATION/TRAINING PROGRAM

## 2024-02-09 PROCEDURE — 94640 AIRWAY INHALATION TREATMENT: CPT

## 2024-02-09 PROCEDURE — 80048 BASIC METABOLIC PNL TOTAL CA: CPT | Performed by: STUDENT IN AN ORGANIZED HEALTH CARE EDUCATION/TRAINING PROGRAM

## 2024-02-09 PROCEDURE — 85018 HEMOGLOBIN: CPT | Performed by: STUDENT IN AN ORGANIZED HEALTH CARE EDUCATION/TRAINING PROGRAM

## 2024-02-09 PROCEDURE — 89050 BODY FLUID CELL COUNT: CPT | Performed by: STUDENT IN AN ORGANIZED HEALTH CARE EDUCATION/TRAINING PROGRAM

## 2024-02-09 PROCEDURE — 85027 COMPLETE CBC AUTOMATED: CPT | Performed by: STUDENT IN AN ORGANIZED HEALTH CARE EDUCATION/TRAINING PROGRAM

## 2024-02-09 PROCEDURE — 87075 CULTR BACTERIA EXCEPT BLOOD: CPT | Performed by: STUDENT IN AN ORGANIZED HEALTH CARE EDUCATION/TRAINING PROGRAM

## 2024-02-09 PROCEDURE — 87176 TISSUE HOMOGENIZATION CULTR: CPT | Performed by: STUDENT IN AN ORGANIZED HEALTH CARE EDUCATION/TRAINING PROGRAM

## 2024-02-09 PROCEDURE — 88300 SURGICAL PATH GROSS: CPT | Performed by: STUDENT IN AN ORGANIZED HEALTH CARE EDUCATION/TRAINING PROGRAM

## 2024-02-09 PROCEDURE — 97530 THERAPEUTIC ACTIVITIES: CPT

## 2024-02-09 PROCEDURE — 88311 DECALCIFY TISSUE: CPT | Performed by: STUDENT IN AN ORGANIZED HEALTH CARE EDUCATION/TRAINING PROGRAM

## 2024-02-09 PROCEDURE — 89051 BODY FLUID CELL COUNT: CPT | Performed by: STUDENT IN AN ORGANIZED HEALTH CARE EDUCATION/TRAINING PROGRAM

## 2024-02-09 PROCEDURE — 73560 X-RAY EXAM OF KNEE 1 OR 2: CPT | Performed by: STUDENT IN AN ORGANIZED HEALTH CARE EDUCATION/TRAINING PROGRAM

## 2024-02-09 PROCEDURE — 0SRD0J9 REPLACEMENT OF LEFT KNEE JOINT WITH SYNTHETIC SUBSTITUTE, CEMENTED, OPEN APPROACH: ICD-10-PCS | Performed by: STUDENT IN AN ORGANIZED HEALTH CARE EDUCATION/TRAINING PROGRAM

## 2024-02-09 PROCEDURE — 97161 PT EVAL LOW COMPLEX 20 MIN: CPT

## 2024-02-09 PROCEDURE — 97116 GAIT TRAINING THERAPY: CPT

## 2024-02-09 PROCEDURE — 3E0T3BZ INTRODUCTION OF ANESTHETIC AGENT INTO PERIPHERAL NERVES AND PLEXI, PERCUTANEOUS APPROACH: ICD-10-PCS | Performed by: ANESTHESIOLOGY

## 2024-02-09 PROCEDURE — 0SPD0JZ REMOVAL OF SYNTHETIC SUBSTITUTE FROM LEFT KNEE JOINT, OPEN APPROACH: ICD-10-PCS | Performed by: STUDENT IN AN ORGANIZED HEALTH CARE EDUCATION/TRAINING PROGRAM

## 2024-02-09 DEVICE — IMPLANTABLE DEVICE: Type: IMPLANTABLE DEVICE | Site: KNEE | Status: FUNCTIONAL

## 2024-02-09 DEVICE — CEMENT BNE 40GM W/ GENT HI VISC RADPQ FOR REV: Type: IMPLANTABLE DEVICE | Site: KNEE | Status: FUNCTIONAL

## 2024-02-09 DEVICE — KIT BNE CEM PREP FEM QUIK-USE 10 PER CA: Type: IMPLANTABLE DEVICE | Site: KNEE | Status: FUNCTIONAL

## 2024-02-09 RX ORDER — VANCOMYCIN HYDROCHLORIDE 1 G/20ML
INJECTION, POWDER, LYOPHILIZED, FOR SOLUTION INTRAVENOUS AS NEEDED
Status: DISCONTINUED | OUTPATIENT
Start: 2024-02-09 | End: 2024-02-09 | Stop reason: HOSPADM

## 2024-02-09 RX ORDER — MIDAZOLAM HYDROCHLORIDE 1 MG/ML
INJECTION INTRAMUSCULAR; INTRAVENOUS
Status: COMPLETED | OUTPATIENT
Start: 2024-02-09 | End: 2024-02-09

## 2024-02-09 RX ORDER — ALBUTEROL SULFATE 90 UG/1
2 AEROSOL, METERED RESPIRATORY (INHALATION) EVERY 6 HOURS
Status: DISCONTINUED | OUTPATIENT
Start: 2024-02-09 | End: 2024-02-10

## 2024-02-09 RX ORDER — METOCLOPRAMIDE HYDROCHLORIDE 5 MG/ML
10 INJECTION INTRAMUSCULAR; INTRAVENOUS ONCE
Status: COMPLETED | OUTPATIENT
Start: 2024-02-09 | End: 2024-02-09

## 2024-02-09 RX ORDER — OXYCODONE HYDROCHLORIDE 5 MG/1
5 TABLET ORAL EVERY 4 HOURS PRN
Status: DISCONTINUED | OUTPATIENT
Start: 2024-02-09 | End: 2024-02-10

## 2024-02-09 RX ORDER — SODIUM CHLORIDE, SODIUM LACTATE, POTASSIUM CHLORIDE, CALCIUM CHLORIDE 600; 310; 30; 20 MG/100ML; MG/100ML; MG/100ML; MG/100ML
INJECTION, SOLUTION INTRAVENOUS CONTINUOUS
Status: DISCONTINUED | OUTPATIENT
Start: 2024-02-09 | End: 2024-02-10

## 2024-02-09 RX ORDER — KETOROLAC TROMETHAMINE 30 MG/ML
30 INJECTION, SOLUTION INTRAMUSCULAR; INTRAVENOUS EVERY 6 HOURS
Status: DISCONTINUED | OUTPATIENT
Start: 2024-02-09 | End: 2024-02-10

## 2024-02-09 RX ORDER — DIPHENHYDRAMINE HCL 25 MG
25 CAPSULE ORAL EVERY 4 HOURS PRN
Status: DISCONTINUED | OUTPATIENT
Start: 2024-02-09 | End: 2024-02-10

## 2024-02-09 RX ORDER — OXYCODONE HYDROCHLORIDE 5 MG/1
2.5 TABLET ORAL EVERY 4 HOURS PRN
Status: DISCONTINUED | OUTPATIENT
Start: 2024-02-09 | End: 2024-02-10

## 2024-02-09 RX ORDER — ONDANSETRON 2 MG/ML
INJECTION INTRAMUSCULAR; INTRAVENOUS AS NEEDED
Status: DISCONTINUED | OUTPATIENT
Start: 2024-02-09 | End: 2024-02-09 | Stop reason: SURG

## 2024-02-09 RX ORDER — PROCHLORPERAZINE EDISYLATE 5 MG/ML
5 INJECTION INTRAMUSCULAR; INTRAVENOUS EVERY 8 HOURS PRN
Status: DISCONTINUED | OUTPATIENT
Start: 2024-02-09 | End: 2024-02-10

## 2024-02-09 RX ORDER — HYDROMORPHONE HYDROCHLORIDE 1 MG/ML
0.2 INJECTION, SOLUTION INTRAMUSCULAR; INTRAVENOUS; SUBCUTANEOUS EVERY 2 HOUR PRN
Status: DISCONTINUED | OUTPATIENT
Start: 2024-02-09 | End: 2024-02-10

## 2024-02-09 RX ORDER — FAMOTIDINE 20 MG/1
20 TABLET, FILM COATED ORAL ONCE
Status: COMPLETED | OUTPATIENT
Start: 2024-02-09 | End: 2024-02-09

## 2024-02-09 RX ORDER — ASPIRIN 81 MG/1
81 TABLET ORAL 2 TIMES DAILY
Status: DISCONTINUED | OUTPATIENT
Start: 2024-02-09 | End: 2024-02-10

## 2024-02-09 RX ORDER — DOCUSATE SODIUM 100 MG/1
100 CAPSULE, LIQUID FILLED ORAL 2 TIMES DAILY
Status: DISCONTINUED | OUTPATIENT
Start: 2024-02-09 | End: 2024-02-10

## 2024-02-09 RX ORDER — HYDROMORPHONE HYDROCHLORIDE 1 MG/ML
0.6 INJECTION, SOLUTION INTRAMUSCULAR; INTRAVENOUS; SUBCUTANEOUS EVERY 5 MIN PRN
Status: DISCONTINUED | OUTPATIENT
Start: 2024-02-09 | End: 2024-02-09 | Stop reason: HOSPADM

## 2024-02-09 RX ORDER — HYDROMORPHONE HYDROCHLORIDE 1 MG/ML
0.4 INJECTION, SOLUTION INTRAMUSCULAR; INTRAVENOUS; SUBCUTANEOUS EVERY 2 HOUR PRN
Status: DISCONTINUED | OUTPATIENT
Start: 2024-02-09 | End: 2024-02-10

## 2024-02-09 RX ORDER — PROPOFOL 10 MG/ML
INJECTION, EMULSION INTRAVENOUS CONTINUOUS PRN
Status: DISCONTINUED | OUTPATIENT
Start: 2024-02-09 | End: 2024-02-09 | Stop reason: SURG

## 2024-02-09 RX ORDER — METOCLOPRAMIDE 10 MG/1
10 TABLET ORAL ONCE
Status: COMPLETED | OUTPATIENT
Start: 2024-02-09 | End: 2024-02-09

## 2024-02-09 RX ORDER — HYDROMORPHONE HYDROCHLORIDE 1 MG/ML
0.4 INJECTION, SOLUTION INTRAMUSCULAR; INTRAVENOUS; SUBCUTANEOUS EVERY 5 MIN PRN
Status: DISCONTINUED | OUTPATIENT
Start: 2024-02-09 | End: 2024-02-09 | Stop reason: HOSPADM

## 2024-02-09 RX ORDER — CEFAZOLIN SODIUM 2 G/50ML
SOLUTION INTRAVENOUS AS NEEDED
Status: DISCONTINUED | OUTPATIENT
Start: 2024-02-09 | End: 2024-02-09 | Stop reason: SURG

## 2024-02-09 RX ORDER — DIPHENHYDRAMINE HYDROCHLORIDE 50 MG/ML
12.5 INJECTION INTRAMUSCULAR; INTRAVENOUS EVERY 4 HOURS PRN
Status: DISCONTINUED | OUTPATIENT
Start: 2024-02-09 | End: 2024-02-10

## 2024-02-09 RX ORDER — HYDROMORPHONE HYDROCHLORIDE 1 MG/ML
0.2 INJECTION, SOLUTION INTRAMUSCULAR; INTRAVENOUS; SUBCUTANEOUS EVERY 5 MIN PRN
Status: DISCONTINUED | OUTPATIENT
Start: 2024-02-09 | End: 2024-02-09 | Stop reason: HOSPADM

## 2024-02-09 RX ORDER — FAMOTIDINE 10 MG/ML
20 INJECTION, SOLUTION INTRAVENOUS ONCE
Status: COMPLETED | OUTPATIENT
Start: 2024-02-09 | End: 2024-02-09

## 2024-02-09 RX ORDER — NALOXONE HYDROCHLORIDE 0.4 MG/ML
0.08 INJECTION, SOLUTION INTRAMUSCULAR; INTRAVENOUS; SUBCUTANEOUS AS NEEDED
Status: DISCONTINUED | OUTPATIENT
Start: 2024-02-09 | End: 2024-02-09 | Stop reason: HOSPADM

## 2024-02-09 RX ORDER — ACETAMINOPHEN 500 MG
1000 TABLET ORAL EVERY 6 HOURS
Status: DISCONTINUED | OUTPATIENT
Start: 2024-02-09 | End: 2024-02-10

## 2024-02-09 RX ORDER — TRANEXAMIC ACID 100 MG/ML
INJECTION, SOLUTION INTRAVENOUS AS NEEDED
Status: DISCONTINUED | OUTPATIENT
Start: 2024-02-09 | End: 2024-02-09 | Stop reason: SURG

## 2024-02-09 RX ORDER — ROSUVASTATIN CALCIUM 10 MG/1
10 TABLET, COATED ORAL DAILY
Status: DISCONTINUED | OUTPATIENT
Start: 2024-02-09 | End: 2024-02-10

## 2024-02-09 RX ORDER — PHENYLEPHRINE HYDROCHLORIDE 10 MG/ML
INJECTION INTRAVENOUS AS NEEDED
Status: DISCONTINUED | OUTPATIENT
Start: 2024-02-09 | End: 2024-02-09 | Stop reason: SURG

## 2024-02-09 RX ORDER — ROPIVACAINE HYDROCHLORIDE 5 MG/ML
INJECTION, SOLUTION EPIDURAL; INFILTRATION; PERINEURAL
Status: COMPLETED | OUTPATIENT
Start: 2024-02-09 | End: 2024-02-09

## 2024-02-09 RX ORDER — HYDROCHLOROTHIAZIDE 25 MG/1
25 TABLET ORAL DAILY
Status: DISCONTINUED | OUTPATIENT
Start: 2024-02-10 | End: 2024-02-10

## 2024-02-09 RX ORDER — CELECOXIB 200 MG/1
400 CAPSULE ORAL ONCE
Status: COMPLETED | OUTPATIENT
Start: 2024-02-09 | End: 2024-02-09

## 2024-02-09 RX ORDER — DEXAMETHASONE SODIUM PHOSPHATE 4 MG/ML
INJECTION, SOLUTION INTRA-ARTICULAR; INTRALESIONAL; INTRAMUSCULAR; INTRAVENOUS; SOFT TISSUE AS NEEDED
Status: DISCONTINUED | OUTPATIENT
Start: 2024-02-09 | End: 2024-02-09 | Stop reason: SURG

## 2024-02-09 RX ORDER — SODIUM CHLORIDE, SODIUM LACTATE, POTASSIUM CHLORIDE, CALCIUM CHLORIDE 600; 310; 30; 20 MG/100ML; MG/100ML; MG/100ML; MG/100ML
INJECTION, SOLUTION INTRAVENOUS CONTINUOUS
Status: DISCONTINUED | OUTPATIENT
Start: 2024-02-09 | End: 2024-02-09 | Stop reason: HOSPADM

## 2024-02-09 RX ORDER — MORPHINE SULFATE 4 MG/ML
2 INJECTION, SOLUTION INTRAMUSCULAR; INTRAVENOUS EVERY 10 MIN PRN
Status: DISCONTINUED | OUTPATIENT
Start: 2024-02-09 | End: 2024-02-09 | Stop reason: HOSPADM

## 2024-02-09 RX ORDER — DIPHENHYDRAMINE HYDROCHLORIDE 50 MG/ML
25 INJECTION INTRAMUSCULAR; INTRAVENOUS ONCE AS NEEDED
Status: ACTIVE | OUTPATIENT
Start: 2024-02-09 | End: 2024-02-09

## 2024-02-09 RX ORDER — ENEMA 19; 7 G/133ML; G/133ML
1 ENEMA RECTAL ONCE AS NEEDED
Status: DISCONTINUED | OUTPATIENT
Start: 2024-02-09 | End: 2024-02-10

## 2024-02-09 RX ORDER — ONDANSETRON 2 MG/ML
4 INJECTION INTRAMUSCULAR; INTRAVENOUS EVERY 6 HOURS PRN
Status: DISCONTINUED | OUTPATIENT
Start: 2024-02-09 | End: 2024-02-10

## 2024-02-09 RX ORDER — CEFAZOLIN SODIUM/WATER 2 G/20 ML
2 SYRINGE (ML) INTRAVENOUS ONCE
Status: DISCONTINUED | OUTPATIENT
Start: 2024-02-09 | End: 2024-02-09 | Stop reason: HOSPADM

## 2024-02-09 RX ORDER — CEFAZOLIN SODIUM/WATER 2 G/20 ML
2 SYRINGE (ML) INTRAVENOUS EVERY 8 HOURS
Qty: 40 ML | Refills: 0 | Status: COMPLETED | OUTPATIENT
Start: 2024-02-09 | End: 2024-02-10

## 2024-02-09 RX ORDER — LIDOCAINE HYDROCHLORIDE 10 MG/ML
INJECTION, SOLUTION INFILTRATION; PERINEURAL
Status: COMPLETED | OUTPATIENT
Start: 2024-02-09 | End: 2024-02-09

## 2024-02-09 RX ORDER — PROPOFOL 10 MG/ML
INJECTION, EMULSION INTRAVENOUS AS NEEDED
Status: DISCONTINUED | OUTPATIENT
Start: 2024-02-09 | End: 2024-02-09 | Stop reason: SURG

## 2024-02-09 RX ORDER — VANCOMYCIN HYDROCHLORIDE
15 ONCE
Status: COMPLETED | OUTPATIENT
Start: 2024-02-09 | End: 2024-02-09

## 2024-02-09 RX ORDER — FENTANYL CITRATE 50 UG/ML
INJECTION, SOLUTION INTRAMUSCULAR; INTRAVENOUS AS NEEDED
Status: DISCONTINUED | OUTPATIENT
Start: 2024-02-09 | End: 2024-02-09 | Stop reason: SURG

## 2024-02-09 RX ORDER — ACETAMINOPHEN 500 MG
1000 TABLET ORAL ONCE
Status: DISCONTINUED | OUTPATIENT
Start: 2024-02-09 | End: 2024-02-09 | Stop reason: HOSPADM

## 2024-02-09 RX ORDER — SODIUM CHLORIDE 9 MG/ML
INJECTION, SOLUTION INTRAVENOUS CONTINUOUS
Status: DISCONTINUED | OUTPATIENT
Start: 2024-02-09 | End: 2024-02-10

## 2024-02-09 RX ORDER — BUPIVACAINE HYDROCHLORIDE 7.5 MG/ML
INJECTION, SOLUTION INTRASPINAL
Status: COMPLETED | OUTPATIENT
Start: 2024-02-09 | End: 2024-02-09

## 2024-02-09 RX ORDER — POLYETHYLENE GLYCOL 3350 17 G/17G
17 POWDER, FOR SOLUTION ORAL DAILY PRN
Status: DISCONTINUED | OUTPATIENT
Start: 2024-02-09 | End: 2024-02-10

## 2024-02-09 RX ORDER — SENNOSIDES 8.6 MG
17.2 TABLET ORAL NIGHTLY
Status: DISCONTINUED | OUTPATIENT
Start: 2024-02-09 | End: 2024-02-10

## 2024-02-09 RX ORDER — ACETAMINOPHEN 500 MG
1000 TABLET ORAL ONCE
Status: COMPLETED | OUTPATIENT
Start: 2024-02-09 | End: 2024-02-09

## 2024-02-09 RX ORDER — MORPHINE SULFATE 10 MG/ML
6 INJECTION, SOLUTION INTRAMUSCULAR; INTRAVENOUS EVERY 10 MIN PRN
Status: DISCONTINUED | OUTPATIENT
Start: 2024-02-09 | End: 2024-02-09 | Stop reason: HOSPADM

## 2024-02-09 RX ORDER — BISACODYL 10 MG
10 SUPPOSITORY, RECTAL RECTAL
Status: DISCONTINUED | OUTPATIENT
Start: 2024-02-09 | End: 2024-02-10

## 2024-02-09 RX ORDER — DEXAMETHASONE SODIUM PHOSPHATE 10 MG/ML
INJECTION, SOLUTION INTRAMUSCULAR; INTRAVENOUS
Status: COMPLETED | OUTPATIENT
Start: 2024-02-09 | End: 2024-02-09

## 2024-02-09 RX ORDER — MORPHINE SULFATE 4 MG/ML
4 INJECTION, SOLUTION INTRAMUSCULAR; INTRAVENOUS EVERY 10 MIN PRN
Status: DISCONTINUED | OUTPATIENT
Start: 2024-02-09 | End: 2024-02-09 | Stop reason: HOSPADM

## 2024-02-09 RX ADMIN — PROPOFOL 30 MG: 10 INJECTION, EMULSION INTRAVENOUS at 11:32:00

## 2024-02-09 RX ADMIN — PHENYLEPHRINE HYDROCHLORIDE 200 MCG: 10 INJECTION INTRAVENOUS at 09:23:00

## 2024-02-09 RX ADMIN — TRANEXAMIC ACID 1000 MG: 100 INJECTION, SOLUTION INTRAVENOUS at 10:47:00

## 2024-02-09 RX ADMIN — PHENYLEPHRINE HYDROCHLORIDE 100 MCG: 10 INJECTION INTRAVENOUS at 10:10:00

## 2024-02-09 RX ADMIN — ROPIVACAINE HYDROCHLORIDE 30 ML: 5 INJECTION, SOLUTION EPIDURAL; INFILTRATION; PERINEURAL at 08:02:00

## 2024-02-09 RX ADMIN — PROPOFOL 40 MG: 10 INJECTION, EMULSION INTRAVENOUS at 11:30:00

## 2024-02-09 RX ADMIN — LIDOCAINE HYDROCHLORIDE 3 ML: 10 INJECTION, SOLUTION INFILTRATION; PERINEURAL at 08:43:00

## 2024-02-09 RX ADMIN — PHENYLEPHRINE HYDROCHLORIDE 100 MCG: 10 INJECTION INTRAVENOUS at 09:04:00

## 2024-02-09 RX ADMIN — PROPOFOL 100 MCG/KG/MIN: 10 INJECTION, EMULSION INTRAVENOUS at 08:55:00

## 2024-02-09 RX ADMIN — PHENYLEPHRINE HYDROCHLORIDE 100 MCG: 10 INJECTION INTRAVENOUS at 10:23:00

## 2024-02-09 RX ADMIN — MIDAZOLAM HYDROCHLORIDE 2 MG: 1 INJECTION INTRAMUSCULAR; INTRAVENOUS at 07:56:00

## 2024-02-09 RX ADMIN — PROPOFOL 140 MCG/KG/MIN: 10 INJECTION, EMULSION INTRAVENOUS at 09:01:00

## 2024-02-09 RX ADMIN — PHENYLEPHRINE HYDROCHLORIDE 100 MCG: 10 INJECTION INTRAVENOUS at 09:14:00

## 2024-02-09 RX ADMIN — BUPIVACAINE HYDROCHLORIDE 1.8 ML: 7.5 INJECTION, SOLUTION INTRASPINAL at 08:49:00

## 2024-02-09 RX ADMIN — PROPOFOL 120 MCG/KG/MIN: 10 INJECTION, EMULSION INTRAVENOUS at 09:33:00

## 2024-02-09 RX ADMIN — PROPOFOL 100 MCG/KG/MIN: 10 INJECTION, EMULSION INTRAVENOUS at 09:46:00

## 2024-02-09 RX ADMIN — TRANEXAMIC ACID 1000 MG: 100 INJECTION, SOLUTION INTRAVENOUS at 08:51:00

## 2024-02-09 RX ADMIN — PHENYLEPHRINE HYDROCHLORIDE 100 MCG: 10 INJECTION INTRAVENOUS at 11:00:00

## 2024-02-09 RX ADMIN — SODIUM CHLORIDE, SODIUM LACTATE, POTASSIUM CHLORIDE, CALCIUM CHLORIDE: 600; 310; 30; 20 INJECTION, SOLUTION INTRAVENOUS at 07:56:00

## 2024-02-09 RX ADMIN — ONDANSETRON 4 MG: 2 INJECTION INTRAMUSCULAR; INTRAVENOUS at 09:37:00

## 2024-02-09 RX ADMIN — DEXAMETHASONE SODIUM PHOSPHATE 4 MG: 4 INJECTION, SOLUTION INTRA-ARTICULAR; INTRALESIONAL; INTRAMUSCULAR; INTRAVENOUS; SOFT TISSUE at 09:10:00

## 2024-02-09 RX ADMIN — PROPOFOL 40 MCG/KG/MIN: 10 INJECTION, EMULSION INTRAVENOUS at 11:17:00

## 2024-02-09 RX ADMIN — FENTANYL CITRATE 100 MCG: 50 INJECTION, SOLUTION INTRAMUSCULAR; INTRAVENOUS at 08:54:00

## 2024-02-09 RX ADMIN — SODIUM CHLORIDE, SODIUM LACTATE, POTASSIUM CHLORIDE, CALCIUM CHLORIDE: 600; 310; 30; 20 INJECTION, SOLUTION INTRAVENOUS at 11:36:00

## 2024-02-09 RX ADMIN — PROPOFOL 80 MG: 10 INJECTION, EMULSION INTRAVENOUS at 11:28:00

## 2024-02-09 RX ADMIN — PHENYLEPHRINE HYDROCHLORIDE 200 MCG: 10 INJECTION INTRAVENOUS at 09:51:00

## 2024-02-09 RX ADMIN — PHENYLEPHRINE HYDROCHLORIDE 200 MCG: 10 INJECTION INTRAVENOUS at 10:45:00

## 2024-02-09 RX ADMIN — MIDAZOLAM HYDROCHLORIDE 2 MG: 1 INJECTION INTRAMUSCULAR; INTRAVENOUS at 08:41:00

## 2024-02-09 RX ADMIN — LIDOCAINE HYDROCHLORIDE 5 ML: 10 INJECTION, SOLUTION INFILTRATION; PERINEURAL at 07:56:00

## 2024-02-09 RX ADMIN — DEXAMETHASONE SODIUM PHOSPHATE 10 MG: 10 INJECTION, SOLUTION INTRAMUSCULAR; INTRAVENOUS at 08:02:00

## 2024-02-09 RX ADMIN — CEFAZOLIN SODIUM 2 G: 2 SOLUTION INTRAVENOUS at 08:38:00

## 2024-02-09 RX ADMIN — PHENYLEPHRINE HYDROCHLORIDE 200 MCG: 10 INJECTION INTRAVENOUS at 09:36:00

## 2024-02-09 RX ADMIN — PROPOFOL 50 MG: 10 INJECTION, EMULSION INTRAVENOUS at 08:54:00

## 2024-02-09 NOTE — ANESTHESIA PROCEDURE NOTES
Spinal Block    Date/Time: 2/9/2024 8:43 AM    Performed by: Ziggy Ames MD  Authorized by: Ziggy Ames MD      General Information and Staff    Start Time:  2/9/2024 8:43 AM  End Time:  2/9/2024 8:49 AM  Anesthesiologist:  Ziggy Ames MD  Performed by:  Anesthesiologist  Patient Location:  OR  Site identification: surface landmarks    Reason for Block: at surgeon's request and surgical anesthesia    Preanesthetic Checklist: patient identified, IV checked, risks and benefits discussed, monitors and equipment checked, pre-op evaluation, timeout performed, anesthesia consent and sterile technique used      Procedure Details    Patient Position:  Sitting  Prep: Betadine and patient draped    Monitoring:  Cardiac monitor, continuous pulse ox and heart rate  Approach:  Midline  Location:  L4-5  Injection Technique:  Single-shot    Needle    Needle Type:  Sprotte  Needle Gauge:  22 G  Needle Length:  3.5 in    Assessment    Sensory Level:  T6  Events: clear CSF, CSF aspirated, well tolerated and blood negative      Additional Comments

## 2024-02-09 NOTE — CM/SW NOTE
DIANN performed chart review     Per chart review, Dr Behery anticipates pt should discharge and go right to outpatient PT    Disposition / PT plan: Admit, Straight to PT clinic     NN anticipated at discharge    PLAN: DC home Outpatient PT clinic    CAMRYN Mcclain, MSW ext. [phone]

## 2024-02-09 NOTE — OPERATIVE REPORT
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     SURGEON: Omar A Behery, MD  Assistant(s): Seng FITZPATRICK, Crystal Gong CSA     Anesthesia type: Spinal, adductor canal block  Estimated Blood Loss: 150cc     Drains: None    Specimen: Fluid cell count, Tissue cultures     Findings: Significant lateral laxity coronally in extension and midflexion flexion.   Complete medial abrasive wear of the poly liner and mechanical failure.   Well fixed femoral and tibial components.   Cell count1.7K PMNS (5% neutrophils)     Complications: None immediately apparent        Explants:   S&N CR TKA with dished insert     Implants:  Wilson Triathlon - Size 3  TS Femoral component with 10mm medial/lateral distal augments and 5mm PL and PM augments.                15mm x 50mm cemented stem with 25mm stem extender  [place] Triathlon - Size 3 Universal Tibial Baseplate               12mm x 50mm cemented stem  13mm [place] Triathlon TS insert     Indication:   This is a 63 year old lady, history of a primary TKA done by a previous provider, who presented with chronic, severe worsening knee pain as well increasing varus deformity and instability with ambulation refractory to non-operative management including bracing. Pre-operative workup including radiographs demonstrated evidence of coronal gap imbalance in extension. Surgical versus non-surgical options were discussed with the patient, and together we decided it is best to proceed with a revision total knee arthroplasty of both components given varus femoral/tibial  positions, and lack of the ability to utilize a constrained liner if needed on the existing CR femur.     All risks and benefits of surgery including bleeding, infection, joint instability, clark-prosthetic fracture, extensor mechanism injury, neurovascular injury, as well as medical and anesthesia-related complications were discussed with the patient / family, who elected to proceed with surgery.      Procedure in detail:     The patient was taken to the operating room and positioned supine on a regular table, where the above anesthesia was administered.  Intravenous antibiotics were administered.  The patient was positioned supine, and all bony prominences were well-padded.  The knee was then prepped and draped in sterile fashion.      A pre-surgical pause was performed to correctly identify the patient, procedure, and extremity, including verification of the signed surgical site. The extremity was exsanguinated using Esmarch bandage, and the tourniquet was raised to 300mmHg.      A midline incision was made utilizing the patient's prior scar which was extended proximally. Full thickness sub-fascial medial and lateral flaps were raised. Arthrocentesis was performed yielding 3cc of synovial fluid which was sent for cell count.    A medial parapatellar arthrotomy was made, and a medial proximal tibial subperiosteal elevation was done for exposure. The fat pad was elevated off of the anterolateral tibial plateau. The distal femoral synovium was recessed. A partial medial and lateral synovectomy was performed for exposure and tissue samples were sent for culture.      Stability was examined. Overall, there was significant medial/lateral laxity coronally with no end point to varus stress.      The knee was flexed, and the 11mm poly was removed. The poly was noted to have significant abrasive wear and damage medially, with complete effacement of the medial rim. The tibial and femoral components were checked and noted to  both be well fixed. The patella was well fixed as well.     The femoral component was exposed, and a microsagittal saw, pencil tip prabhjot and single sided reciprocating saw were used to loosen the implant/cement interface. The femoral component was disimpacted using a bone tamp, with minimal bone loss.       The tibial tray - bone interface was exposed and cut with a single sided reciprocator saw. The tibia was then subluxed, exposed and extracted with the femoral/tibial extractor tamp with minimal bone loss. An extramedullary tibial guide was then used to resect 2mm off of the proximal tibial surface, in neutral mechanical alignment. The tibia was sized to a size 3 baseplate, and the template guide was pinned in place in appropriate external rotation. The boss reamer was used to open the metaphysis and the keel was punched. The trial guide was removed. The tibia was reamed to a diameter of 14.    The femoral canal was reamed to a 16mm diameter, and the IM cut guide was attached and pinned and used to create a flat distal cut surface medial and lateral. We planned for 10mm medial/lateral augments to distalize the joint line to appropriate height. The 4-1 guide for a size 3 femoral component was attached and rotated parallel to the neutral tibial cut, with the medial/lateral collateral tensioned using distraction, and used to perform appropriate resections. The box resection as made as well.      A trial tibial component with a [id number] stem was placed. And the trial size 3 femoral component with distal and posterior augments and a 75mm x 15mm stem was placed.      A 13mm PS was trialed, and the medial gap was tighter in extension and flexion compared to the medial side. We then performed a complete posteromedial release and a tibial reduction osteotomy to equalize the gaps, however, there was still asymmetry given severe lateral laxity. We therefore elected to use a constrained liner to achieve appropriate stability.      A clark-articular pain cocktail injection was delivered to the soft tissues.     The trials were removed and the tibia was exposed. An appropriate size cement restrictor was placed in the canal. The canal was irrigated and dried. Two batches of refobacin with gentamicin, were mixed and delivered into the tibial canal using a cement gun. The cement was pressurized and the tibial implant was impacted into place. Excess cement was removed. The tibial component was held in place until cement polymerized.     The femur was then exposed, and an appropriate size cement restrictor was placed in the canal at the appropriate depth. The canal was irrigated and dried. Three batches of refobacin with gentamicin, were mixed and delivered into the femoral canal using a cement gun. The cement was pressurized and the femoral implant was impacted into place and held in place in flexion. Excess cement was removed. The femoral component was held in place until cement polymerized.     We then trialed constrained liners and were satisfied with the 13mm insert in terms of flexion/extension gap thickness and overall stability.     The joint was irrigated before placement and impaction of the final size 13mm TS liner followed by impaction of the reinforcement post.  The knee was then thoroughly irrigated once again.       The arthrotomy was then closed with a combination of #1 Vicryl and #2 Quill.  The subcutaneous tissues and skin were closed with 0 Vicryl, 2-0 vicryl and 3-0 nylon. A sterile dressing was then applied followed by compressive bandage. The patient tolerated the procedure well, and there were no known immediate complications.        Post-operative Plan:     The intra-operative cultures will be followed.     VTE prophylaxis will consist of early mobilization, mechanical compressive devices, and Aspirin 81 BID if not medically contra-indicated.  The patient will be weight-bearing as tolerated and allowed to mobilize with  physical therapy.  The patient will be permitted range of motion as tolerated    The patient will follow up in clinic in 2 weeks for a wound check, suture removal and radiographic evaluation.

## 2024-02-09 NOTE — ANESTHESIA PROCEDURE NOTES
Peripheral Block    Date/Time: 2/9/2024 7:56 AM    Performed by: Ziggy Ames MD  Authorized by: Ziggy Ames MD      General Information and Staff    Start Time:  2/9/2024 7:56 AM  End Time:  2/9/2024 8:02 AM  Anesthesiologist:  Ziggy Ames MD  Performed by:  Anesthesiologist  Patient Location:  PACU    Block Placement: Pre Induction  Site Identification: real time ultrasound guided, nerve stimulator and image stored and retrievable    Block site/laterality marked before start: site marked  Reason for Block: at surgeon's request and post-op pain management    Preanesthetic Checklist: 2 patient identifers, IV checked, site marked, risks and benefits discussed, monitors and equipment checked, pre-op evaluation, timeout performed, anesthesia consent, sterile technique used, no prohibitive neurological deficits and no local skin infection at insertion site      Procedure Details    Patient Position:  Supine  Prep: ChloraPrep    Monitoring:  Cardiac monitor, heart rate, continuous pulse ox and blood pressure cuff  Block Type:  Adductor canal  Laterality:  Left  Injection Technique:  Single-shot    Needle    Needle Type:  Echogenic  Needle Gauge:  22 G  Needle Length:  90 mm  Needle Localization:  Nerve stimulator and ultrasound guidance  Reason for Ultrasound Use: appropriate spread of the medication was noted in real time and no ultrasound evidence of intravascular and/or intraneural injection    Nerve Stimulator: 1.5 amps  Muscle Twitch Response: quadriceps response      Assessment    Injection Assessment:  Good spread noted, incremental injection, local visualized surrounding nerve on ultrasound, low pressure, negative aspiration for heme, negative resistance and no pain on injection  Paresthesia Pain:  None  Heart Rate Change: No        Medications  2/9/2024 7:56 AM  midazolam (VERSED)injection 2mg/2ml - Intravenous, Left Hand   2 mg - 2/9/2024 7:56:00 AM  ropivacaine (NAROPIN) injection 0.5% - Infiltration,  Left Anterior Thigh   30 mL - 2/9/2024 8:02:00 AM  dexamethasone (DECADRON) PF injection 10 mg/ml - Injection, Left Anterior Thigh   10 mg - 2/9/2024 8:02:00 AM  lidocaine injection 1% - Intradermal, Left Leg   5 mL - 2/9/2024 7:56:00 AM    Additional Comments    Ultrasound guidance of needle to nerve to vastus medialis with twitch obtained at 1.5 mA, easy painless low resistance injection of 7 ml of LA mixture with 10 ml syringe; needle then advanced to adductor canal with easy painless low resistance injection of 23 ml of LA mixture, no signs or symptoms of local anesthetic toxicity, patient tolerated procedure well

## 2024-02-09 NOTE — INTERVAL H&P NOTE
Pre-op Diagnosis: Left total knee arthroplasty instability    The above referenced H&P was reviewed by Seng Chavarria PA-C on 2/9/2024, the patient was examined and no significant changes have occurred in the patient's condition since the H&P was performed.  I discussed with the patient and/or legal representative the potential benefits, risks and side effects of this procedure; the likelihood of the patient achieving goals; and potential problems that might occur during recuperation.  I discussed reasonable alternatives to the procedure, including risks, benefits and side effects related to the alternatives and risks related to not receiving this procedure.  We will proceed with procedure as planned.

## 2024-02-09 NOTE — DISCHARGE INSTRUCTIONS
DISCHARGE INSTRUCTIONS:  PLACE ICE TO SURGICAL AREA 20-30 MINUTES ON/ 20-30 MINUTES OFF. THIS IS TO HELP WITH PAIN & SWELLING.    TAKE YOUR MEDICATIONS BELOW AS PRESCRIBED BY YOUR DOCTOR. THESE HAVE BEEN SENT TO YOUR PHARMACY.    IT IS OK TO BEAR WEIGHT AS TOLERATED ON THE OPERATIVE EXTREMITY.     CALL TO CONFIRM YOUR FOLLOW UP APPOINTMENT WITH DR. BEHERY TO BE SEEN IN 2-3 WEEKS POSTOP. [phone]    MEDICATIONS:    POST OP MEDICATION REGIMEN              MULTI-MODAL   PAIN REGIMEN     DRUG   FOR   FREQUENCY & DURATION   QTY   NOTES     WEANING  TIPS       Gabapentin 100mg   Nerve pain   Take 2 tabs every 8 hours for 14 days    90   No refills You may discontinue this medication prior to 14 days if you do not tolerate it.        Tylenol 500mg     Mild pain   Take 2 tabs every 6 hours     90   Can purchase over-the-counter    Stop using medication if no longer having pain.      Tramadol 50mg     Moderate pain   Take 1-2 tabs every 6 hours only as needed   45 May prescribe a refill, but ideally, this should be tapered off after surgery Stop using this medication 2nd   As pain decreases over time, increase the interval between doses (6 hours to 8, then 12, then 24) to taper off the medication.      Meloxicam 15mg     Inflammation   Take 1 tab daily for 30 days     30   May refill if needed beyond 30 days   Recommend completing the 30 day supply.           BREAKTHROUGH PAIN         Oxycodone 5mg       Severe pain     Take 1-2 tabs every 4-6 hours only as needed for severe pain       40   Take at least 1 hr apart from Tramadol.    Ideally, no refill. The goal is to taper off this medication as soon as possible, as it can be addictive and have side effects.    Stop using this medication 1st if no longer having severe pain.         BLOOD THINNER     Aspirin 81mg   Blood clot prevention   Take 1 tab twice daily for 30 days     60     No refills   Complete the entire course of your blood thinner.                      STOOL  SOFTENER     Sennokot 8.6/50mg   Constipation   Take 1 tab twice daily  while on opioids     60 Refer to discharge instructions if constipation persists even after taking this medication   Stop taking if having diarrhea or loose stools.           ANTI-NAUSEA   Ondansetron 4mg   Nausea   Take 1 tab every 8 hours as needed if nauseous     20   No Refill      ANTI-ACID REFLUX / GASTRITIS   Pantoprazole 40mg   Acid reflux, gastric ulcer prophylaxis   Take 1 tab every day along with Meloxicam     60   May refill if Meloxicam refilled          DRESSING:    YOU MAY REMOVE ACE BANDAGE AND COTTON WRAP 3  DAYS AFTER SURGERY    YOU HAVE A SPECIAL DRESSING CALLED AN AQUACEL DRESSING OVER YOUR INCISION.    THE DRESSING STAYS FOR 12 DAYS FROM SURGERY.    YOU MAY SHOWER AS SOON AS YOU RETURN HOME WITH THE AQUACEL DRESSING INTACT OVER YOUR INCISION AREA.    IF THE DRESSING LEAKS OR COMES LOOSE BEFORE THE 7 DAY PERIOD CONTACT YOUR DOCTOR / SURGEON.    AFTER   12 DAYS THE DRESSING IS REMOVED BY PULLING ON THE EDGE OF THE DRESSING AND GENTLY STRETCHING IT, THEN PEEL IT OFF SLOWLY LIKE A BANDAID. IF YOU HAVE ANY QUESTIONS OR CONCERNS ABOUT THE DRESSING CONTACT YOUR DOCTOR.    IF DRAINAGE IS PRESENT AT ANYTIME FROM YOUR DRESSING OR FROM YOUR INCISION CALL YOUR DOCTOR / SURGEON AS SOON AS POSSIBLE.      REASONS TO CALL YOUR DOCTOR:  TEMPERATURE .0 OR MORE  PERSISTANT NAUSEA OR VOMITTING - ESPECIALLY IF YOU ARE UNABLE TO EAT OR DRINK.  INCREASED LEVEL OF PAIN OR PAIN WHICH IS NOT CONTROLLED BY YOUR PAIN MEDICINE.  PERSISTENT DRAINAGE AT ANYTIME FROM YOUR SURGICAL AREA / INCISION.  PAIN, TENDERNESS OR SUDDEN SWELLING IN YOUR CALF REGION OF THE LOWER EXTREMITIES - THIS IS A POSSIBLE SIGN OF A BLOOD CLOT.  ANY CHANGES IN SENSATION IN YOUR BODY IN THE AREA OF YOUR SURGERY = NUMBNESS OR TINGLING.  ANY CHANGES IN CIRCULATION IN YOUR BODY IN THE AREA OF YOUR SURGERY = CHANGES  IN COLOR EITHER DARKER OR VERY PALE; OR  IF AREA FEELS COLD TO  THE TOUCH AS COMPARED TO OTHER AREAS.  ANY CHANGES IN FUNCTION IN YOUR BODY IN THE AREA OF YOUR SURGERY = CHANGE IN MOVEMENT - UNABLE TO MOVE OR WIGGLE FINGERS, TOES OR FOOT OR ANY OTHER CHANGES IN NORMAL BODY FUNCTIONING.  FOR ANY OF THE ABOVE OR ANY OTHER QUESTIONS OR CONCERNS CALL YOUR DOCTOR/ SURGEON AS SOON AS POSSIBLE.    Omar Behery, MD MPH  Orthopaedic Surgeon, Adult Hip and Knee Reconstruction  [facility] at [address], [address] [address]  Office: (P) [phone] (F) [phone]  Adminstrative Assistant: Ness Lin         Do not take hydrochorothiazide if BP < 110

## 2024-02-09 NOTE — ANESTHESIA PREPROCEDURE EVALUATION
Anesthesia PreOp Note    HPI:     Torrie Smith is a 63 year old female who presents for preoperative consultation requested by: Behery, Omar Atef, MD    Date of Surgery: 2/9/2024    Procedure(s):  Left Total Knee Arthroplasty Both Components  Indication: Left total knee arthroplasty instability    Relevant Problems   No relevant active problems       NPO:  Last Liquid Consumption Date: 02/08/24  Last Liquid Consumption Time: 2330  Last Solid Consumption Date: 02/08/24  Last Solid Consumption Time: 1900  Last Liquid Consumption Date: 02/08/24          History Review:  Patient Active Problem List    Diagnosis Date Noted    Failed total left knee replacement (HCC) 01/29/2024    Prediabetes 03/11/2022    Mild intermittent asthma without complication 03/11/2022    Plantar fasciitis, bilateral 03/11/2022    Lymphedema 09/01/2021    Hyperlipidemia, unspecified hyperlipidemia type 03/03/2021    Essential hypertension 03/03/2021    Class 2 obesity due to excess calories without serious comorbidity with body mass index (BMI) of 38.0 to 38.9 in adult 03/03/2021    Bilateral primary osteoarthritis of knee 03/03/2021       Past Medical History:   Diagnosis Date    Acid reflux     Asthma     Esophageal reflux     Essential hypertension     High blood pressure     High cholesterol     Hyperlipidemia     Osteoarthritis     Rheumatoid arthritis (HCC)     Vertigo     Visual impairment     wears glasses       Past Surgical History:   Procedure Laterality Date    EXCISIONAL BIOPSY LEFT  1999/2000    benign lump removed    TONSILLECTOMY      TOTAL KNEE REPLACEMENT Bilateral 2019       Medications Prior to Admission   Medication Sig Dispense Refill Last Dose    Potassium Chloride ER (KLOR-CON 10) 10 MEQ Oral Tab CR Take 1 tablet (10 mEq total) by mouth daily. 30 tablet 1 2/8/2024 at 0900    ROSUVASTATIN 10 MG Oral Tab TAKE ONE TABLET BY MOUTH ONE TIME DAILY 90 tablet 3 2/8/2024 at 0900    HYDROCHLOROTHIAZIDE 25 MG Oral Tab TAKE ONE  TABLET BY MOUTH ONE TIME DAILY 90 tablet 3 2/8/2024 at 0900    Albuterol Sulfate  (90 Base) MCG/ACT Inhalation Aero Soln Inhale 2 puffs into the lungs every 6 (six) hours. 1 Inhaler 6 2/8/2024 at 1200     Current Facility-Administered Medications Ordered in Epic   Medication Dose Route Frequency Provider Last Rate Last Admin    lactated ringers infusion   Intravenous Continuous Behery, Omar Atef, MD 20 mL/hr at 02/09/24 0701 New Bag at 02/09/24 0701    acetaminophen (Tylenol Extra Strength) tab 1,000 mg  1,000 mg Oral Once Behery, Omar Atef, MD        ceFAZolin (Ancef) 2 g in 20mL IV syringe premix  2 g Intravenous Once Seng Chavarria PA-C        vancomycin (Vancocin) 1.5 g in sodium chloride 0.9% 250mL IVPB premix  15 mg/kg Intravenous Once Seng Chavarria PA-C 166.7 mL/hr at 02/09/24 0707 1,500 mg at 02/09/24 0707    clonidine-EPINEPHrine-ropivacaine-ketorolac (CERTS) (Duraclon-Adrenalin-Naropin-Toradol) pain cocktail irrigation   Intra-articular Once Behery, Omar Atef, MD        povidone-iodine (Betadine) 10 % 17.5 mL in sodium chloride 0.9 % 500 mL irrigation   Irrigation Once Behery, Omar Atef, MD         No current [facility]-ordered outpatient medications on file.       Allergies   Allergen Reactions    Atorvastatin OTHER (SEE COMMENTS)     Muscle weakness and pain       Family History   Problem Relation Age of Onset    Stroke Father     Diabetes Mother     Stroke Paternal Grandmother      Social History     Socioeconomic History    Marital status:    Tobacco Use    Smoking status: Never     Passive exposure: Past    Smokeless tobacco: Never   Vaping Use    Vaping Use: Never used   Substance and Sexual Activity    Alcohol use: Yes     Comment: occ    Drug use: No       Available pre-op labs reviewed.  Lab Results   Component Value Date    WBC 7.8 01/19/2024    RBC 4.80 01/19/2024    HGB 13.7 01/19/2024    HCT 42.6 01/19/2024    MCV 88.8 01/19/2024    MCH 28.5 01/19/2024    MCHC 32.2  01/19/2024    RDW 13.6 01/19/2024    .0 01/19/2024     Lab Results   Component Value Date     01/22/2024    K 3.7 01/22/2024     01/22/2024    CO2 30.0 01/22/2024    BUN 18 01/22/2024    CREATSERUM 0.82 01/22/2024    GLU 94 01/22/2024    CA 9.8 01/22/2024     Lab Results   Component Value Date    INR 0.91 01/19/2024       Vital Signs:  Body mass index is 37.49 kg/m².   height is 1.613 m (5' 3.5\") and weight is 97.5 kg (215 lb). Her oral temperature is 98.1 °F (36.7 °C). Her blood pressure is 159/64 and her pulse is 71. Her respiration is 16 and oxygen saturation is 100%.   Vitals:    02/05/24 1557 02/09/24 0658   BP:  159/64   Pulse:  71   Resp:  16   Temp:  98.1 °F (36.7 °C)   TempSrc:  Oral   SpO2:  100%   Weight: 94.3 kg (208 lb) 97.5 kg (215 lb)   Height: 1.613 m (5' 3.5\")         Anesthesia Evaluation      Airway   Mallampati: II  TM distance: >3 FB  Neck ROM: full  Dental - Dentition appears grossly intact     Pulmonary - normal exam   (+) asthma  Cardiovascular - normal exam  (+) hypertension    Neuro/Psych      GI/Hepatic/Renal    (+) GERD    Endo/Other    (+) arthritis  Abdominal  - normal exam                 Anesthesia Plan:   ASA:  2  Plan:   Regional and spinal  Post-op Pain Management: Saphenous block  Plan Comments: Adductor canal block with spinal anesthesia - no history of any anesthetic complications or problems  Informed Consent Plan and Risks Discussed With:  Patient  Use of Blood Products Discussed With:  Patient  Blood Product Use Consented    Discussed plan with:  Surgeon      I have informed Torrie Smith and/or legal guardian or family member of the nature of the anesthetic plan, benefits, risks including possible dental damage if relevant, major complications, and any alternative forms of anesthetic management.   All of the patient's questions were answered to the best of my ability. The patient desires the anesthetic management as planned.  Ziggy Ames MD  2/9/2024 7:44  AM  Present on Admission:   Hyperlipidemia, unspecified hyperlipidemia type   Essential hypertension   Class 2 obesity due to excess calories without serious comorbidity with body mass index (BMI) of 38.0 to 38.9 in adult   Prediabetes   Mild intermittent asthma without complication

## 2024-02-09 NOTE — ANESTHESIA POSTPROCEDURE EVALUATION
Patient: Torrie Smith    Procedure Summary       Date: 02/09/24 Room / Location: [facility] MAIN OR  / [facility] MAIN OR    Anesthesia Start: 0838 Anesthesia Stop: 1148    Procedure: Left Total Knee Arthroplasty Both Components (Left: Knee) Diagnosis:       Mechanical loosening of internal left knee prosthetic joint, initial encounter ([facility])      (Left total knee arthroplasty instability)    Surgeons: Behery, Omar Atef, MD Anesthesiologist: Ziggy Ames MD    Anesthesia Type: regional, spinal ASA Status: 2            Anesthesia Type: regional, spinal    Vitals Value Taken Time   /80 02/09/24 1404   Temp 97.7 °F (36.5 °C) 02/09/24 1330   Pulse 59 02/09/24 1404   Resp 16 02/09/24 1404   SpO2 96 % 02/09/24 1404       [facility] AN Post Evaluation:   Patient Evaluated in PACU  Patient Participation: complete - patient participated  Level of Consciousness: awake and alert  Pain Score: 2  Pain Management: adequate  Airway Patency:patent  Dental exam unchanged from preop  Yes    Nausea/Vomiting: none  Cardiovascular Status: acceptable  Respiratory Status: acceptable  Postoperative Hydration acceptable      Ziggy Ames MD  2/9/2024 2:09 PM

## 2024-02-09 NOTE — PHYSICAL THERAPY NOTE
PHYSICAL THERAPY KNEE EVALUATION - INPATIENT      Room Number: 401/401-A  Evaluation Date: 2/9/2024  Type of Evaluation: Initial  Physician Order: PT Eval and Treat    Presenting Problem: s/p revision left TKA 2/9/24  Co-Morbidities : bilat TKA  Reason for Therapy: Mobility Dysfunction and Discharge Planning    PHYSICAL THERAPY ASSESSMENT   Patient is a 63 year old female admitted 2/9/2024 for revision left TKA.  Prior to admission, patient's baseline is independent, using cane for ambulation.  Patient is currently functioning below baseline with transfers, gait, and stair negotiation.  Patient is requiring contact guard assist as a result of the following impairments: decreased functional strength, decreased endurance/aerobic capacity, pain, and limited left ROM.  Physical Therapy will continue to follow for duration of hospitalization.    Patient will benefit from continued skilled PT Services at discharge to promote functional independence in home.  Anticipate patient will return home with OP PT. Pt reports she has her appointments set up.     PLAN  PT Treatment Plan: Endurance;Energy conservation;Patient education;Gait training;Range of motion;Strengthening;Stair training  Rehab Potential : Good  Frequency (Obs): Daily    PHYSICAL THERAPY MEDICAL/SOCIAL HISTORY        Problem List  Principal Problem:    Failed total left knee replacement (HCC)  Active Problems:    Hyperlipidemia, unspecified hyperlipidemia type    Essential hypertension    Class 2 obesity due to excess calories without serious comorbidity with body mass index (BMI) of 38.0 to 38.9 in adult    Prediabetes    Mild intermittent asthma without complication    Preop testing      HOME SITUATION  Home Situation  Type of Home: House  Home Layout: One level  Stairs to Enter : 6  Stairs to Bedroom: 0  Lives With: Spouse  Drives: Yes  Patient Owned Equipment: Rolling walker;Cane  Patient Regularly Uses: Glasses     Prior Level of [place]: Pt reports  ind pta, using cane for ambulation.  Pt lives with supportive spouse who will be able to assist as needed upon edw dc.        SUBJECTIVE      PHYSICAL THERAPY EXAMINATION   OBJECTIVE     Fall Risk: Standard fall risk    WEIGHT BEARING RESTRICTION  Weight Bearing Restriction: L lower extremity           L Lower Extremity: Weight Bearing as Tolerated    PAIN ASSESSMENT  Rating: 3  Location: left knee  Management Techniques: Activity promotion    COGNITION  Overall Cognitive Status:  WFL - within functional limits    RANGE OF MOTION AND STRENGTH ASSESSMENT  Upper extremity ROM and strength are within functional limits   Lower extremity ROM is within functional limits except left knee  Lower extremity strength is within functional limits     BALANCE  Static Sitting: Good  Dynamic Sitting: Good  Static Standing: Fair -  Dynamic Standing: Fair -                                                                         AM-PAC '6-Clicks' INPATIENT SHORT FORM - BASIC MOBILITY  How much difficulty does the patient currently have...  Patient Difficulty: Turning over in bed (including adjusting bedclothes, sheets and blankets)?: A Little   Patient Difficulty: Sitting down on and standing up from a chair with arms (e.g., wheelchair, bedside commode, etc.): A Little   Patient Difficulty: Moving from lying on back to sitting on the side of the bed?: A Little   How much help from another person does the patient currently need...   Help from Another: Moving to and from a bed to a chair (including a wheelchair)?: A Little   Help from Another: Need to walk in hospital room?: A Little   Help from Another: Climbing 3-5 steps with a railing?: A Little     AM-PAC Score:  Raw Score: 18   Approx Degree of Impairment: 46.58%   Standardized Score (AM-PAC Scale): 43.63   CMS Modifier (G-Code): CK     FUNCTIONAL ABILITY STATUS  Functional Mobility/Gait Assessment  Gait Assistance: Contact guard assist  Distance (ft): 100  Assistive Device:  Rolling walker  Pattern: L Decreased stance time  Supine to Sit: contact guard assist=  Sit to Stand: contact guard assist    Exercise/Education Provided:  Functional activity tolerated  Gait training  Posture  ROM  Strengthening    Patient ok to see per rn.    Pt recd in supine, educated in role of PT, goals for session, importance of consistent mobility, ankle pumps for DVT prevention, use of ice for edema control. Pt agreeable to PT.   Pt educated in and performed LE therex per TKA protocol, issued written HEP.   Pt transferred supine to sit with cga, no dizziness.  Pt provided verbal instruction and demonstration of rw use, stood to rw with cga, able to perform left TKE, marching in place with no left knee buckling, instructed in gait sequencing.  Pt amb with rw with gait training emphasis on heel toe pattern, upright posture. Pt returned to bs chair, educated about POC, discussed dc recommendation.   Pt educated in use of towel roll under left ankle to promote right knee terminal extension, educated in importance of achieving ROM in a timely fashion.  Discussed session with RN, discussed assist need with PCT.       THERAPEUTIC EXERCISES  Lower Extremity Ankle pumps  Heel slides  LAQ  Quad sets     Position Sitting and Supine         The patient's Approx Degree of Impairment: 46.58% has been calculated based on documentation in the [facility] '6 clicks' Inpatient Basic Mobility Short Form.  Research supports that patients with this level of impairment may benefit from home with OPPT.  Final disposition will be made by interdisciplinary medical team.    Patient End of Session: Up in chair;Needs met;Call light within reach;RN aware of session/findings;All patient questions and concerns addressed;Ice applied    CURRENT GOALS      Goals to be met by: 2/10/23  Patient Goal Patient's self-stated goal is: to go straight to OPPT   Goal #1 Patient is able to demonstrate supine - sit EOB @ level: modified independent     Goal  #1   Current Status    Goal #2 Patient is able to demonstrate transfers Sit to/from Stand at assistance level: supervision     Goal #2  Current Status    Goal #3 Patient is able to ambulate 150 feet with assistive device at assistance level: supervision   Goal #3   Current Status    Goal #4 Patient will negotiate 4 stairs/one curb w/ assistive device and supervision   Goal #4   Current Status    Goal #5  AROM 0 degrees extension to 95 degrees flexion     Goal #5   Current Status    Goal #6 Patient independently performs home exercise program for ROM/strengthening per the instructions provided in preparation for discharge.   Goal #6  Current Status        Patient Evaluation Complexity Level:  History Low - no personal factors and/or co-morbidities   Examination of body systems Low - addressing 1-2 elements   Clinical Presentation Low - Stable   Clinical Decision Making Low Complexity     Gait Training: 15 minutes  Therapeutic Activity: 15 minutes

## 2024-02-10 VITALS
BODY MASS INDEX: 37.62 KG/M2 | DIASTOLIC BLOOD PRESSURE: 52 MMHG | TEMPERATURE: 98 F | SYSTOLIC BLOOD PRESSURE: 109 MMHG | HEART RATE: 56 BPM | OXYGEN SATURATION: 98 % | RESPIRATION RATE: 16 BRPM | WEIGHT: 215 LBS | HEIGHT: 63.5 IN

## 2024-02-10 LAB
ANION GAP SERPL CALC-SCNC: 7 MMOL/L (ref 0–18)
BUN BLD-MCNC: 22 MG/DL (ref 9–23)
BUN/CREAT SERPL: 25.9 (ref 10–20)
CALCIUM BLD-MCNC: 9.1 MG/DL (ref 8.7–10.4)
CHLORIDE SERPL-SCNC: 107 MMOL/L (ref 98–112)
CO2 SERPL-SCNC: 28 MMOL/L (ref 21–32)
CREAT BLD-MCNC: 0.85 MG/DL
DEPRECATED RDW RBC AUTO: 44.3 FL (ref 35.1–46.3)
EGFRCR SERPLBLD CKD-EPI 2021: 76 ML/MIN/1.73M2 (ref 60–?)
ERYTHROCYTE [DISTWIDTH] IN BLOOD BY AUTOMATED COUNT: 13.6 % (ref 11–15)
GLUCOSE BLD-MCNC: 130 MG/DL (ref 70–99)
HCT VFR BLD AUTO: 38 %
HGB BLD-MCNC: 12.1 G/DL
MCH RBC QN AUTO: 28.3 PG (ref 26–34)
MCHC RBC AUTO-ENTMCNC: 31.8 G/DL (ref 31–37)
MCV RBC AUTO: 89 FL
OSMOLALITY SERPL CALC.SUM OF ELEC: 299 MOSM/KG (ref 275–295)
PLATELET # BLD AUTO: 208 10(3)UL (ref 150–450)
POTASSIUM SERPL-SCNC: 4.2 MMOL/L (ref 3.5–5.1)
RBC # BLD AUTO: 4.27 X10(6)UL
SODIUM SERPL-SCNC: 142 MMOL/L (ref 136–145)
WBC # BLD AUTO: 19.3 X10(3) UL (ref 4–11)

## 2024-02-10 PROCEDURE — 97116 GAIT TRAINING THERAPY: CPT

## 2024-02-10 PROCEDURE — 85027 COMPLETE CBC AUTOMATED: CPT | Performed by: STUDENT IN AN ORGANIZED HEALTH CARE EDUCATION/TRAINING PROGRAM

## 2024-02-10 PROCEDURE — 97165 OT EVAL LOW COMPLEX 30 MIN: CPT

## 2024-02-10 PROCEDURE — 80048 BASIC METABOLIC PNL TOTAL CA: CPT | Performed by: STUDENT IN AN ORGANIZED HEALTH CARE EDUCATION/TRAINING PROGRAM

## 2024-02-10 PROCEDURE — 97535 SELF CARE MNGMENT TRAINING: CPT

## 2024-02-10 PROCEDURE — 97530 THERAPEUTIC ACTIVITIES: CPT

## 2024-02-10 RX ORDER — PSEUDOEPHEDRINE HCL 30 MG
100 TABLET ORAL 2 TIMES DAILY
Qty: 20 CAPSULE | Refills: 0 | Status: SHIPPED | OUTPATIENT
Start: 2024-02-10 | End: 2024-02-10

## 2024-02-10 RX ORDER — ASPIRIN 81 MG/1
81 TABLET ORAL 2 TIMES DAILY
Status: SHIPPED | COMMUNITY
Start: 2024-02-10

## 2024-02-10 RX ORDER — OXYCODONE HYDROCHLORIDE 5 MG/1
5 TABLET ORAL EVERY 6 HOURS PRN
Status: SHIPPED | COMMUNITY
Start: 2024-02-10

## 2024-02-10 NOTE — OPERATIVE REPORT
[facility]     ORTHO DAILY PROGRESS NOTE    Patient: Torrie Smith    Subjective:  Nothing acute overnight.  Pain in the operative extremity is well controlled.  Patient denies new onset numbness/tingling in the operative extremity.  Patient also denies chest pain / shortness of breath, nausea/vomiting.      Objective:  Vitals:    02/10/24 0816   BP: 109/52   Pulse: 56   Resp: 16   Temp: 97.7 °F (36.5 °C)     I/O last 3 completed shifts:  In: 2270 [P.O.:120; I.V.:2150]  Out: 600 [Urine:500; Blood:100]     Gen: awake, alert, not in acute distress  Abdomen nondistended, non-tender    Left Lower Extremity: dressing clean, dry, intact.    Sensation intact to light touch in Sural/Saphenous/Tibial/Superficial Peroneal/Deep Peroneal and Tibial distributions.   Motor exam : 5/5 EHL/FHL/TA/GSC.  Vascular exam: Palpable dorsalis pedis pulses. Cap refill ~2s in the toes. Foot warm and well perfused    Labs:  Lab Results   Component Value Date    HGB 12.1 02/10/2024    HGB 13.6 02/09/2024    HGB 13.7 01/19/2024     Lab Results   Component Value Date    INR 0.91 01/19/2024         ASSESSMENT/PLAN: 63 year old yo female s/p left total knee arthroplasty revision for instability and poly wear on  2/9/2024   - Weight bearing status: WBAT LLE  - Range of motion: As tolerated  - Mechanical DVT prophylaxis and chemoprophylaxis with ASA 81 BID   - 24 hours of perioperative antibiotics  - PT for mobilization and motion  - Advance diet as tolerated. Discontinue IV fluids POD1 if tolerating diet.   - Multimodal pain control regimen ordered  - Disposition:  home with outpatient PT    Omar Behery, MD  [facility]

## 2024-02-10 NOTE — DISCHARGE SUMMARY
General Medicine Discharge Summary     Patient ID:  Torrie Smith  63 year old  8/8/1960    Admit date: 2/9/2024    Discharge date and time: 02/10/24    Attending Physician: Behery, Omar Atef, MD     Primary Care Physician: Scott Prieto MD     Reason for admission: knee pain    Discharge Diagnoses: Left total knee arthroplasty instability  Preop testing    Discharged Condition: stable    Disposition: home    Consults:   Consultants         Provider   Role Specialty     Fermin Crockett DO  Consulting Physician Family Medicine     Anup Riggs DO  Consulting Physician HOSPITALIST              HPI: Per Dr. Box    Patient is a 63 year old female with PMH sig for TLKA, HLD, HTN, asthma, and GERD who presented to the hospital for TLKA revision. Patient was seen and examined post operatively in PACU. During my exam, she was resting comfortably in bed. Admitted to some knee pain but tolerable. No other complaints including chest pain, SOB, abdominal pain, or nausea. Vitals stable.      Hospital Course:     Patient is a 63 year old female with PMH sig for TLKA, HLD, HTN, asthma, and GERD who presented to the hospital for TLKA revision. Post op course without issue, stable for discharge home on POD#1     Failed LTKA  - s/p LTKA revision POD #    - prn pain medication   - monitor respiratory status  - encouraged IS use  - prn anti emetics  - CBC in the morning  - PT/SW  - dvt ppx: ASA BID  - rest of management per ortho     Asthma  - chronic, stable  - resume PRN albuterol     HTN  - resume hydrochlorothiazide     HLD  - resume statin      Exam  GEN: female in NAD  HEENT: EOMI  Pulm: CTAB, no crackles or wheezes  CV: RRR, no murmurs  ABD: Soft, non-tender, non-distended, +BS  SKIN: warm, dry  EXT: no edema    Operative Procedures: Procedure(s) (LRB):  Left Total Knee Arthroplasty Both Components (Left)     Imaging: XR KNEE (1 OR 2 VIEWS), LEFT (CPT=73560)    Result Date: 2/9/2024  CONCLUSION:  Postoperative  changes of left knee prosthesis without radiographic evidence of immediate complication.    Dictated by (CST): Sudarshan Johnson MD on 2/09/2024 at 1:27 PM     Finalized by (CST): Sudarshan Johnson MD on 2/09/2024 at 1:29 PM               Home Medication Changes:     I reconciled current and discharge medications on day of discharge. These medication changes have been made as below         Medication List        START taking these medications      aspirin 81 MG Tbec     oxyCODONE 5 MG Tabs            CONTINUE taking these medications      albuterol 108 (90 Base) MCG/ACT Aers  Commonly known as: Ventolin HFA  Inhale 2 puffs into the lungs every 6 (six) hours.     hydroCHLOROthiazide 25 MG Tabs  Commonly known as: Hydrodiuril  TAKE ONE TABLET BY MOUTH ONE TIME DAILY     Potassium Chloride ER 10 MEQ Tbcr  Commonly known as: Klor-Con 10  Take 1 tablet (10 mEq total) by mouth daily.     rosuvastatin 10 MG Tabs  Commonly known as: Crestor  TAKE ONE TABLET BY MOUTH ONE TIME DAILY              Activity:  as instructed  Diet: regular diet  Wound Care: keep wound clean and dry  Code Status: Full Code  O2: n/a    Follow-up with:    PCP   Specialist ortho       FU      DC instructions:      Other Discharge Instructions:         DISCHARGE INSTRUCTIONS:  PLACE ICE TO SURGICAL AREA 20-30 MINUTES ON/ 20-30 MINUTES OFF. THIS IS TO HELP WITH PAIN & SWELLING.    TAKE YOUR MEDICATIONS BELOW AS PRESCRIBED BY YOUR DOCTOR. THESE HAVE BEEN SENT TO YOUR PHARMACY.    IT IS OK TO BEAR WEIGHT AS TOLERATED ON THE OPERATIVE EXTREMITY.     CALL TO CONFIRM YOUR FOLLOW UP APPOINTMENT WITH DR. BEHERY TO BE SEEN IN 2-3 WEEKS POSTOP. [phone]    MEDICATIONS:    POST OP MEDICATION REGIMEN              MULTI-MODAL   PAIN REGIMEN     DRUG   FOR   FREQUENCY & DURATION   QTY   NOTES     WEANING  TIPS       Gabapentin 100mg   Nerve pain   Take 2 tabs every 8 hours for 14 days    90   No refills You may discontinue this medication prior to 14 days if you do  not tolerate it.        Tylenol 500mg     Mild pain   Take 2 tabs every 6 hours     90   Can purchase over-the-counter    Stop using medication if no longer having pain.      Tramadol 50mg     Moderate pain   Take 1-2 tabs every 6 hours only as needed   45 May prescribe a refill, but ideally, this should be tapered off after surgery Stop using this medication 2nd   As pain decreases over time, increase the interval between doses (6 hours to 8, then 12, then 24) to taper off the medication.      Meloxicam 15mg     Inflammation   Take 1 tab daily for 30 days     30   May refill if needed beyond 30 days   Recommend completing the 30 day supply.           BREAKTHROUGH PAIN         Oxycodone 5mg       Severe pain     Take 1-2 tabs every 4-6 hours only as needed for severe pain       40   Take at least 1 hr apart from Tramadol.    Ideally, no refill. The goal is to taper off this medication as soon as possible, as it can be addictive and have side effects.    Stop using this medication 1st if no longer having severe pain.         BLOOD THINNER     Aspirin 81mg   Blood clot prevention   Take 1 tab twice daily for 30 days     60     No refills   Complete the entire course of your blood thinner.                      STOOL SOFTENER     Sennokot 8.6/50mg   Constipation   Take 1 tab twice daily  while on opioids     60 Refer to discharge instructions if constipation persists even after taking this medication   Stop taking if having diarrhea or loose stools.           ANTI-NAUSEA   Ondansetron 4mg   Nausea   Take 1 tab every 8 hours as needed if nauseous     20   No Refill      ANTI-ACID REFLUX / GASTRITIS   Pantoprazole 40mg   Acid reflux, gastric ulcer prophylaxis   Take 1 tab every day along with Meloxicam     60   May refill if Meloxicam refilled          DRESSING:    YOU MAY REMOVE ACE BANDAGE AND COTTON WRAP 3  DAYS AFTER SURGERY    YOU HAVE A SPECIAL DRESSING CALLED AN AQUACEL DRESSING OVER YOUR INCISION.    THE DRESSING  STAYS FOR 12 DAYS FROM SURGERY.    YOU MAY SHOWER AS SOON AS YOU RETURN HOME WITH THE AQUACEL DRESSING INTACT OVER YOUR INCISION AREA.    IF THE DRESSING LEAKS OR COMES LOOSE BEFORE THE 7 DAY PERIOD CONTACT YOUR DOCTOR / SURGEON.    AFTER   12 DAYS THE DRESSING IS REMOVED BY PULLING ON THE EDGE OF THE DRESSING AND GENTLY STRETCHING IT, THEN PEEL IT OFF SLOWLY LIKE A BANDAID. IF YOU HAVE ANY QUESTIONS OR CONCERNS ABOUT THE DRESSING CONTACT YOUR DOCTOR.    IF DRAINAGE IS PRESENT AT ANYTIME FROM YOUR DRESSING OR FROM YOUR INCISION CALL YOUR DOCTOR / SURGEON AS SOON AS POSSIBLE.      REASONS TO CALL YOUR DOCTOR:  TEMPERATURE .0 OR MORE  PERSISTANT NAUSEA OR VOMITTING - ESPECIALLY IF YOU ARE UNABLE TO EAT OR DRINK.  INCREASED LEVEL OF PAIN OR PAIN WHICH IS NOT CONTROLLED BY YOUR PAIN MEDICINE.  PERSISTENT DRAINAGE AT ANYTIME FROM YOUR SURGICAL AREA / INCISION.  PAIN, TENDERNESS OR SUDDEN SWELLING IN YOUR CALF REGION OF THE LOWER EXTREMITIES - THIS IS A POSSIBLE SIGN OF A BLOOD CLOT.  ANY CHANGES IN SENSATION IN YOUR BODY IN THE AREA OF YOUR SURGERY = NUMBNESS OR TINGLING.  ANY CHANGES IN CIRCULATION IN YOUR BODY IN THE AREA OF YOUR SURGERY = CHANGES  IN COLOR EITHER DARKER OR VERY PALE; OR  IF AREA FEELS COLD TO THE TOUCH AS COMPARED TO OTHER AREAS.  ANY CHANGES IN FUNCTION IN YOUR BODY IN THE AREA OF YOUR SURGERY = CHANGE IN MOVEMENT - UNABLE TO MOVE OR WIGGLE FINGERS, TOES OR FOOT OR ANY OTHER CHANGES IN NORMAL BODY FUNCTIONING.  FOR ANY OF THE ABOVE OR ANY OTHER QUESTIONS OR CONCERNS CALL YOUR DOCTOR/ SURGEON AS SOON AS POSSIBLE.    Omar Behery, MD MPH  Orthopaedic Surgeon, Adult Hip and Knee Reconstruction  [facility] at [address], [address] [address]  Office: (P) [phone] (F) [phone]  Adminstrative Assistant: Ness Lin         Do not take hydrochorothiazide if BP < 110          Follow-up with labs: none    Total Time Coordinating Care: 31 minutes    Patient had  opportunity to ask questions and state understand and agree with therapeutic plan as outlined      Romelia Eric MD  [facility] Hospitalist

## 2024-02-10 NOTE — PLAN OF CARE
\"T\" is from home with spouse. POD 1. Ambulating with walker and standby assist. Tolerating diet and voiding. Pain controlled. Cleared by all services. Plan for home with OP PT today.     Problem: Patient Centered Care  Goal: Patient preferences are identified and integrated in the patient's plan of care  Description: Interventions:  - What would you like us to know as we care for you?   - Provide timely, complete, and accurate information to patient/family  - Incorporate patient and family knowledge, values, beliefs, and cultural backgrounds into the planning and delivery of care  - Encourage patient/family to participate in care and decision-making at the level they choose  - Honor patient and family perspectives and choices  Outcome: Adequate for Discharge     Problem: Patient/Family Goals  Goal: Patient/Family Long Term Goal  Description: Patient's Long Term Goal:     Interventions:  -   - See additional Care Plan goals for specific interventions  Outcome: Adequate for Discharge  Goal: Patient/Family Short Term Goal  Description: Patient's Short Term Goal:     Interventions:   -  - See additional Care Plan goals for specific interventions  Outcome: Adequate for Discharge

## 2024-02-10 NOTE — PLAN OF CARE
Patient Alert and Oriented X4. Pain managed with scheduled medication. Standby assist with walker. Fall precautions in place, call light and personal belongings within arms reach.  Plan for Home with outpatient PT    Problem: Patient Centered Care  Goal: Patient preferences are identified and integrated in the patient's plan of care  Description: Interventions:  - What would you like us to know as we care for you?   - Provide timely, complete, and accurate information to patient/family  - Incorporate patient and family knowledge, values, beliefs, and cultural backgrounds into the planning and delivery of care  - Encourage patient/family to participate in care and decision-making at the level they choose  - Honor patient and family perspectives and choices  Outcome: Progressing

## 2024-02-10 NOTE — PLAN OF CARE
POD 0. Alert x4. RA. Voiding. Pain controlled. Continue ASA, SCDs. IVF infusing. Ambulating with RW x1 assist. Tolerating diet. From home with spouse. Call light within reach.    Problem: Patient Centered Care  Goal: Patient preferences are identified and integrated in the patient's plan of care  Description: Interventions:  - What would you like us to know as we care for you?   - Provide timely, complete, and accurate information to patient/family  - Incorporate patient and family knowledge, values, beliefs, and cultural backgrounds into the planning and delivery of care  - Encourage patient/family to participate in care and decision-making at the level they choose  - Honor patient and family perspectives and choices  Outcome: Progressing     Problem: Patient/Family Goals  Goal: Patient/Family Long Term Goal  Description: Patient's Long Term Goal:     Interventions:  -   - See additional Care Plan goals for specific interventions  Outcome: Progressing  Goal: Patient/Family Short Term Goal  Description: Patient's Short Term Goal:     Interventions:   -   - See additional Care Plan goals for specific interventions  Outcome: Progressing

## 2024-02-10 NOTE — PHYSICAL THERAPY NOTE
PHYSICAL THERAPY KNEE TREATMENT NOTE - INPATIENT     Room Number: 401/401-A             Presenting Problem: s/p revision left TKA 24  Co-Morbidities : bilateral TKA    Problem List  Principal Problem:    Failed total left knee replacement (HCC)  Active Problems:    Hyperlipidemia, unspecified hyperlipidemia type    Essential hypertension    Class 2 obesity due to excess calories without serious comorbidity with body mass index (BMI) of 38.0 to 38.9 in adult    Prediabetes    Mild intermittent asthma without complication    Preop testing      PHYSICAL THERAPY ASSESSMENT   RN approved PT session and pt medicated prior PT session. Patient demonstrates good  progress this session, goals  remain in progress.    Patient continues to function near baseline with bed mobility, gait, and stair negotiation.  Contributing factors to remaining limitations include decreased functional strength, pain, and limited L knee  ROM.  Next session anticipate patient to progress transfers, gait, and stair negotiation.  Physical Therapy will continue to follow patient for duration of hospitalization.  Pt with improved with sit to stand transfers and amb outside of room. Practice steps negotiation with 1 HR 7 up/down with pt's  present .after amb pt left in chair with all needs in reach.    Patient continues to benefit from continued skilled PT services: at discharge to promote functional independence and safety with additional support and return home with OP PT.    PLAN  PT Treatment Plan: Patient education;Gait training;Range of motion;Strengthening;Stair training;Transfer training;Balance training  Frequency (Obs): Daily    SUBJECTIVE  It is my 3rd knee surgery.    OBJECTIVE  Precautions: Limb alert - left    WEIGHT BEARING STATUS           L Lower Extremity: Weight Bearing as Tolerated    PAIN ASSESSMENT   Ratin  Location: L knee  Management Techniques: Activity promotion;Breathing  techniques;Repositioning    BALANCE  Static Sitting: Good  Dynamic Sitting: Good  Static Standing: Fair +  Dynamic Standing: Fair +    ACTIVITY TOLERANCE                         O2 WALK       AM-PAC '6-Clicks' INPATIENT SHORT FORM - BASIC MOBILITY  How much difficulty does the patient currently have...  Patient Difficulty: Turning over in bed (including adjusting bedclothes, sheets and blankets)?: A Little   Patient Difficulty: Sitting down on and standing up from a chair with arms (e.g., wheelchair, bedside commode, etc.): None   Patient Difficulty: Moving from lying on back to sitting on the side of the bed?: None   How much help from another person does the patient currently need...   Help from Another: Moving to and from a bed to a chair (including a wheelchair)?: None   Help from Another: Need to walk in hospital room?: None   Help from Another: Climbing 3-5 steps with a railing?: A Little     AM-PAC Score:  Raw Score: 22   Approx Degree of Impairment: 20.91%   Standardized Score (AM-PAC Scale): 53.28   CMS Modifier (G-Code): CJ    FUNCTIONAL ABILITY STATUS  Functional Mobility/Gait Assessment  Gait Assistance: Supervision  Distance (ft): 120 ft x2  Assistive Device: Rolling walker  Pattern: L Decreased stance time  Stairs: Stairs  How Many Stairs: 7  Device: 1 Rail  Assist: Contact guard assist  Pattern: Ascend and Descend  Ascend and Descend : Side step  Rolling: supervision  Supine to Sit: stand-by assist  Sit to Supine: stand-by assist  Sit to Stand: stand-by assist    Additional Information:     The patient's Approx Degree of Impairment: 20.91% has been calculated based on documentation in the [facility] '6 clicks' Inpatient Basic Mobility Short Form.  Research supports that patients with this level of impairment may benefit from home with OT PT.  Final disposition will be made by interdisciplinary medical team.    Exercises AM Session    Ankle Pumps 10 reps    Quad Sets 10 reps    Glut Sets 10 reps    Hip  Abd/Add 10 reps    Heel slides 10 reps    Saq 0 reps    SLR 0 reps    Sitting Knee Flexion 10 reps    Standing heel/toe raises 0 reps    Standing knee flexion 0 reps    Extension stretch  1x          Knee ROM                 Patient End of Session: Up in chair;Needs met;Call light within reach;RN aware of session/findings;All patient questions and concerns addressed;Ice applied;Family present    CURRENT GOALS  Goals to be met by: 2/10/23  Patient Goal Patient's self-stated goal is: to go straight to OPPT   Goal #1 Patient is able to demonstrate supine - sit EOB @ level: modified independent     Goal #1   Current Status NT   Goal #2 Patient is able to demonstrate transfers Sit to/from Stand at assistance level: supervision     Goal #2  Current Status SBA   Goal #3 Patient is able to ambulate 150 feet with assistive device at assistance level: supervision   Goal #3   Current Status Supervision 120 ft x2 with RW   Goal #4 Patient will negotiate 4 stairs/one curb w/ assistive device and supervision   Goal #4   Current Status 7 steps up/down with 1 HR CGA   Goal #5  AROM 0 degrees extension to 95 degrees flexion     Goal #5   Current Status In progress   Goal #6 Patient independently performs home exercise program for ROM/strengthening per the instructions provided in preparation for discharge.   Goal #6  Current Status In progress           Gait Training: 15 minutes  Therapeutic Activity: 15 minutes

## 2024-02-12 NOTE — PAYOR COMM NOTE
--------------  DISCHARGE REVIEW    Payor: [facility] [place] POS/MCNP  Subscriber #:  [id number]  Authorization Number: [id number]    Admit date: 2/9/24  Admit time:   6:03 AM  Discharge Date: 2/10/2024 11:54 AM     Admitting Physician: Behery, Omar Atef, MD  Attending Physician:  No att. providers found  Primary Care Physician: Scott Prieto MD          Discharge Summary Notes        Discharge Summary signed by Romelia Eric MD at 2/10/2024 11:17 AM       Author: Romelia Eric MD Specialty: HOSPITALIST Author Type: Physician    Filed: 2/10/2024 11:17 AM Date of Service: 2/10/2024 11:15 AM Status: Signed    : Romelia Eric MD (Physician)           General Medicine Discharge Summary     Patient ID:  Torrie Smith  63 year old  8/8/1960    Admit date: 2/9/2024    Discharge date and time: 02/10/24    Attending Physician: Behery, Omar Atef, MD     Primary Care Physician: Scott Prieto MD     Reason for admission: knee pain    Discharge Diagnoses: Left total knee arthroplasty instability  Preop testing    Discharged Condition: stable    Disposition: home    Consults:   Consultants         Provider   Role Specialty     Fermin Crockett DO  Consulting Physician Family Medicine     Anup Riggs DO  Consulting Physician HOSPITALIST              HPI: Per Dr. Box    Patient is a 63 year old female with PMH sig for TLKA, HLD, HTN, asthma, and GERD who presented to the hospital for TLKA revision. Patient was seen and examined post operatively in PACU. During my exam, she was resting comfortably in bed. Admitted to some knee pain but tolerable. No other complaints including chest pain, SOB, abdominal pain, or nausea. Vitals stable.      Hospital Course:     Patient is a 63 year old female with PMH sig for TLKA, HLD, HTN, asthma, and GERD who presented to the hospital for TLKA revision. Post op course without issue, stable for discharge home on POD#1     Failed LTKA  - s/p LTKA revision POD #    -  prn pain medication   - monitor respiratory status  - encouraged IS use  - prn anti emetics  - CBC in the morning  - PT/SW  - dvt ppx: ASA BID  - rest of management per ortho     Asthma  - chronic, stable  - resume PRN albuterol     HTN  - resume hydrochlorothiazide     HLD  - resume statin      Exam  GEN: female in NAD  HEENT: EOMI  Pulm: CTAB, no crackles or wheezes  CV: RRR, no murmurs  ABD: Soft, non-tender, non-distended, +BS  SKIN: warm, dry  EXT: no edema    Operative Procedures: Procedure(s) (LRB):  Left Total Knee Arthroplasty Both Components (Left)     Imaging: XR KNEE (1 OR 2 VIEWS), LEFT (CPT=73560)    Result Date: 2/9/2024  CONCLUSION:  Postoperative changes of left knee prosthesis without radiographic evidence of immediate complication.    Dictated by (CST): Sudarshan Johnson MD on 2/09/2024 at 1:27 PM     Finalized by (CST): Sudarshan Johnson MD on 2/09/2024 at 1:29 PM               Home Medication Changes:     I reconciled current and discharge medications on day of discharge. These medication changes have been made as below         Medication List        START taking these medications      aspirin 81 MG Tbec     oxyCODONE 5 MG Tabs            CONTINUE taking these medications      albuterol 108 (90 Base) MCG/ACT Aers  Commonly known as: Ventolin HFA  Inhale 2 puffs into the lungs every 6 (six) hours.     hydroCHLOROthiazide 25 MG Tabs  Commonly known as: Hydrodiuril  TAKE ONE TABLET BY MOUTH ONE TIME DAILY     Potassium Chloride ER 10 MEQ Tbcr  Commonly known as: Klor-Con 10  Take 1 tablet (10 mEq total) by mouth daily.     rosuvastatin 10 MG Tabs  Commonly known as: Crestor  TAKE ONE TABLET BY MOUTH ONE TIME DAILY              Activity:  as instructed  Diet: regular diet  Wound Care: keep wound clean and dry  Code Status: Full Code  O2: n/a    Follow-up with:    PCP   Specialist ortho       MARTINEZ BAR instructions:      Other Discharge Instructions:         DISCHARGE INSTRUCTIONS:  PLACE ICE TO  SURGICAL AREA 20-30 MINUTES ON/ 20-30 MINUTES OFF. THIS IS TO HELP WITH PAIN & SWELLING.    TAKE YOUR MEDICATIONS BELOW AS PRESCRIBED BY YOUR DOCTOR. THESE HAVE BEEN SENT TO YOUR PHARMACY.    IT IS OK TO BEAR WEIGHT AS TOLERATED ON THE OPERATIVE EXTREMITY.     CALL TO CONFIRM YOUR FOLLOW UP APPOINTMENT WITH DR. BEHERY TO BE SEEN IN 2-3 WEEKS POSTOP. [phone]    MEDICATIONS:    POST OP MEDICATION REGIMEN              MULTI-MODAL   PAIN REGIMEN     DRUG   FOR   FREQUENCY & DURATION   QTY   NOTES     WEANING  TIPS       Gabapentin 100mg   Nerve pain   Take 2 tabs every 8 hours for 14 days    90   No refills You may discontinue this medication prior to 14 days if you do not tolerate it.        Tylenol 500mg     Mild pain   Take 2 tabs every 6 hours     90   Can purchase over-the-counter    Stop using medication if no longer having pain.      Tramadol 50mg     Moderate pain   Take 1-2 tabs every 6 hours only as needed   45 May prescribe a refill, but ideally, this should be tapered off after surgery Stop using this medication 2nd   As pain decreases over time, increase the interval between doses (6 hours to 8, then 12, then 24) to taper off the medication.      Meloxicam 15mg     Inflammation   Take 1 tab daily for 30 days     30   May refill if needed beyond 30 days   Recommend completing the 30 day supply.           BREAKTHROUGH PAIN         Oxycodone 5mg       Severe pain     Take 1-2 tabs every 4-6 hours only as needed for severe pain       40   Take at least 1 hr apart from Tramadol.    Ideally, no refill. The goal is to taper off this medication as soon as possible, as it can be addictive and have side effects.    Stop using this medication 1st if no longer having severe pain.         BLOOD THINNER     Aspirin 81mg   Blood clot prevention   Take 1 tab twice daily for 30 days     60     No refills   Complete the entire course of your blood thinner.                      STOOL SOFTENER     Sennokot 8.6/50mg    Constipation   Take 1 tab twice daily  while on opioids     60 Refer to discharge instructions if constipation persists even after taking this medication   Stop taking if having diarrhea or loose stools.           ANTI-NAUSEA   Ondansetron 4mg   Nausea   Take 1 tab every 8 hours as needed if nauseous     20   No Refill      ANTI-ACID REFLUX / GASTRITIS   Pantoprazole 40mg   Acid reflux, gastric ulcer prophylaxis   Take 1 tab every day along with Meloxicam     60   May refill if Meloxicam refilled          DRESSING:    YOU MAY REMOVE ACE BANDAGE AND COTTON WRAP 3  DAYS AFTER SURGERY    YOU HAVE A SPECIAL DRESSING CALLED AN AQUACEL DRESSING OVER YOUR INCISION.    THE DRESSING STAYS FOR 12 DAYS FROM SURGERY.    YOU MAY SHOWER AS SOON AS YOU RETURN HOME WITH THE AQUACEL DRESSING INTACT OVER YOUR INCISION AREA.    IF THE DRESSING LEAKS OR COMES LOOSE BEFORE THE 7 DAY PERIOD CONTACT YOUR DOCTOR / SURGEON.    AFTER   12 DAYS THE DRESSING IS REMOVED BY PULLING ON THE EDGE OF THE DRESSING AND GENTLY STRETCHING IT, THEN PEEL IT OFF SLOWLY LIKE A BANDAID. IF YOU HAVE ANY QUESTIONS OR CONCERNS ABOUT THE DRESSING CONTACT YOUR DOCTOR.    IF DRAINAGE IS PRESENT AT ANYTIME FROM YOUR DRESSING OR FROM YOUR INCISION CALL YOUR DOCTOR / SURGEON AS SOON AS POSSIBLE.      REASONS TO CALL YOUR DOCTOR:  TEMPERATURE .0 OR MORE  PERSISTANT NAUSEA OR VOMITTING - ESPECIALLY IF YOU ARE UNABLE TO EAT OR DRINK.  INCREASED LEVEL OF PAIN OR PAIN WHICH IS NOT CONTROLLED BY YOUR PAIN MEDICINE.  PERSISTENT DRAINAGE AT ANYTIME FROM YOUR SURGICAL AREA / INCISION.  PAIN, TENDERNESS OR SUDDEN SWELLING IN YOUR CALF REGION OF THE LOWER EXTREMITIES - THIS IS A POSSIBLE SIGN OF A BLOOD CLOT.  ANY CHANGES IN SENSATION IN YOUR BODY IN THE AREA OF YOUR SURGERY = NUMBNESS OR TINGLING.  ANY CHANGES IN CIRCULATION IN YOUR BODY IN THE AREA OF YOUR SURGERY = CHANGES  IN COLOR EITHER DARKER OR VERY PALE; OR  IF AREA FEELS COLD TO THE TOUCH AS COMPARED TO OTHER  AREAS.  ANY CHANGES IN FUNCTION IN YOUR BODY IN THE AREA OF YOUR SURGERY = CHANGE IN MOVEMENT - UNABLE TO MOVE OR WIGGLE FINGERS, TOES OR FOOT OR ANY OTHER CHANGES IN NORMAL BODY FUNCTIONING.  FOR ANY OF THE ABOVE OR ANY OTHER QUESTIONS OR CONCERNS CALL YOUR DOCTOR/ SURGEON AS SOON AS POSSIBLE.    Omar Behery, MD MPH  Orthopaedic Surgeon, Adult Hip and Knee Reconstruction  [facility] at [address], [facility] [address]  [place] [address]  Office: (P) [phone] (F) [phone]  Adminstrative Assistant: Ness Lin         Do not take hydrochorothiazide if BP < 110          Follow-up with labs: none    Total Time Coordinating Care: 31 minutes    Patient had opportunity to ask questions and state understand and agree with therapeutic plan as outlined      Romelia Eric MD  [facility] Hospitalist            Electronically signed by Romelia Eric MD on 2/10/2024 11:17 AM         REVIEWER COMMENTS

## 2024-08-08 ENCOUNTER — HOSPITAL ENCOUNTER (EMERGENCY)
Facility: HOSPITAL | Age: 64
Discharge: HOME | End: 2024-08-08
Attending: EMERGENCY MEDICINE
Payer: MEDICARE

## 2024-08-08 ENCOUNTER — APPOINTMENT (OUTPATIENT)
Dept: CT IMAGING | Facility: HOSPITAL | Age: 64
End: 2024-08-08
Attending: EMERGENCY MEDICINE
Payer: MEDICARE

## 2024-08-08 VITALS
BODY MASS INDEX: 37.56 KG/M2 | HEART RATE: 65 BPM | RESPIRATION RATE: 16 BRPM | OXYGEN SATURATION: 94 % | DIASTOLIC BLOOD PRESSURE: 78 MMHG | TEMPERATURE: 98 F | WEIGHT: 220 LBS | HEIGHT: 64 IN | SYSTOLIC BLOOD PRESSURE: 116 MMHG

## 2024-08-08 DIAGNOSIS — N20.1 LEFT URETERAL STONE: Primary | ICD-10-CM

## 2024-08-08 LAB
ALBUMIN SERPL-MCNC: 4.6 G/DL (ref 3.2–4.8)
ALBUMIN/GLOB SERPL: 1.6 {RATIO} (ref 1–2)
ALP LIVER SERPL-CCNC: 91 U/L
ALT SERPL-CCNC: 19 U/L
ANION GAP SERPL CALC-SCNC: 9 MMOL/L (ref 0–18)
AST SERPL-CCNC: 26 U/L (ref ?–34)
BASOPHILS # BLD AUTO: 0.03 X10(3) UL (ref 0–0.2)
BASOPHILS NFR BLD AUTO: 0.2 %
BILIRUB SERPL-MCNC: 0.6 MG/DL (ref 0.2–1.1)
BILIRUB UR QL: NEGATIVE
BUN BLD-MCNC: 19 MG/DL (ref 9–23)
BUN/CREAT SERPL: 20.4 (ref 10–20)
CALCIUM BLD-MCNC: 9.6 MG/DL (ref 8.7–10.4)
CHLORIDE SERPL-SCNC: 104 MMOL/L (ref 98–112)
CLARITY UR: CLEAR
CO2 SERPL-SCNC: 28 MMOL/L (ref 21–32)
CREAT BLD-MCNC: 0.93 MG/DL
DEPRECATED RDW RBC AUTO: 44.4 FL (ref 35.1–46.3)
EGFRCR SERPLBLD CKD-EPI 2021: 68 ML/MIN/1.73M2 (ref 60–?)
EOSINOPHIL # BLD AUTO: 0.01 X10(3) UL (ref 0–0.7)
EOSINOPHIL NFR BLD AUTO: 0.1 %
ERYTHROCYTE [DISTWIDTH] IN BLOOD BY AUTOMATED COUNT: 13.8 % (ref 11–15)
GLOBULIN PLAS-MCNC: 2.9 G/DL (ref 2–3.5)
GLUCOSE BLD-MCNC: 141 MG/DL (ref 70–99)
GLUCOSE UR-MCNC: NORMAL MG/DL
HCT VFR BLD AUTO: 40.9 %
HGB BLD-MCNC: 13.4 G/DL
IMM GRANULOCYTES # BLD AUTO: 0.03 X10(3) UL (ref 0–1)
IMM GRANULOCYTES NFR BLD: 0.2 %
KETONES UR-MCNC: 40 MG/DL
LEUKOCYTE ESTERASE UR QL STRIP.AUTO: 25
LIPASE SERPL-CCNC: 33 U/L (ref 12–53)
LYMPHOCYTES # BLD AUTO: 0.74 X10(3) UL (ref 1–4)
LYMPHOCYTES NFR BLD AUTO: 5.7 %
MCH RBC QN AUTO: 28.9 PG (ref 26–34)
MCHC RBC AUTO-ENTMCNC: 32.8 G/DL (ref 31–37)
MCV RBC AUTO: 88.1 FL
MONOCYTES # BLD AUTO: 0.39 X10(3) UL (ref 0.1–1)
MONOCYTES NFR BLD AUTO: 3 %
NEUTROPHILS # BLD AUTO: 11.84 X10 (3) UL (ref 1.5–7.7)
NEUTROPHILS # BLD AUTO: 11.84 X10(3) UL (ref 1.5–7.7)
NEUTROPHILS NFR BLD AUTO: 90.8 %
NITRITE UR QL STRIP.AUTO: NEGATIVE
OSMOLALITY SERPL CALC.SUM OF ELEC: 297 MOSM/KG (ref 275–295)
PH UR: 5 [PH] (ref 5–8)
PLATELET # BLD AUTO: 244 10(3)UL (ref 150–450)
POTASSIUM SERPL-SCNC: 3.1 MMOL/L (ref 3.5–5.1)
PROT SERPL-MCNC: 7.5 G/DL (ref 5.7–8.2)
PROT UR-MCNC: 20 MG/DL
RBC # BLD AUTO: 4.64 X10(6)UL
RBC #/AREA URNS AUTO: >10 /HPF
SODIUM SERPL-SCNC: 141 MMOL/L (ref 136–145)
SP GR UR STRIP: 1.03 (ref 1–1.03)
UROBILINOGEN UR STRIP-ACNC: NORMAL
WBC # BLD AUTO: 13 X10(3) UL (ref 4–11)

## 2024-08-08 PROCEDURE — 96375 TX/PRO/DX INJ NEW DRUG ADDON: CPT

## 2024-08-08 PROCEDURE — 87086 URINE CULTURE/COLONY COUNT: CPT | Performed by: EMERGENCY MEDICINE

## 2024-08-08 PROCEDURE — 96374 THER/PROPH/DIAG INJ IV PUSH: CPT

## 2024-08-08 PROCEDURE — 85025 COMPLETE CBC W/AUTO DIFF WBC: CPT

## 2024-08-08 PROCEDURE — 80053 COMPREHEN METABOLIC PANEL: CPT

## 2024-08-08 PROCEDURE — 81001 URINALYSIS AUTO W/SCOPE: CPT | Performed by: EMERGENCY MEDICINE

## 2024-08-08 PROCEDURE — 85025 COMPLETE CBC W/AUTO DIFF WBC: CPT | Performed by: EMERGENCY MEDICINE

## 2024-08-08 PROCEDURE — 96361 HYDRATE IV INFUSION ADD-ON: CPT

## 2024-08-08 PROCEDURE — 83690 ASSAY OF LIPASE: CPT

## 2024-08-08 PROCEDURE — 74177 CT ABD & PELVIS W/CONTRAST: CPT | Performed by: EMERGENCY MEDICINE

## 2024-08-08 PROCEDURE — 87186 SC STD MICRODIL/AGAR DIL: CPT | Performed by: EMERGENCY MEDICINE

## 2024-08-08 PROCEDURE — 99284 EMERGENCY DEPT VISIT MOD MDM: CPT

## 2024-08-08 PROCEDURE — 80053 COMPREHEN METABOLIC PANEL: CPT | Performed by: EMERGENCY MEDICINE

## 2024-08-08 PROCEDURE — 87077 CULTURE AEROBIC IDENTIFY: CPT | Performed by: EMERGENCY MEDICINE

## 2024-08-08 PROCEDURE — 83690 ASSAY OF LIPASE: CPT | Performed by: EMERGENCY MEDICINE

## 2024-08-08 RX ORDER — ONDANSETRON 2 MG/ML
4 INJECTION INTRAMUSCULAR; INTRAVENOUS ONCE
Status: COMPLETED | OUTPATIENT
Start: 2024-08-08 | End: 2024-08-08

## 2024-08-08 RX ORDER — KETOROLAC TROMETHAMINE 15 MG/ML
15 INJECTION, SOLUTION INTRAMUSCULAR; INTRAVENOUS ONCE
Status: COMPLETED | OUTPATIENT
Start: 2024-08-08 | End: 2024-08-08

## 2024-08-08 RX ORDER — ONDANSETRON 8 MG/1
8 TABLET, ORALLY DISINTEGRATING ORAL EVERY 4 HOURS PRN
Qty: 10 TABLET | Refills: 0 | Status: SHIPPED | OUTPATIENT
Start: 2024-08-08 | End: 2024-08-15

## 2024-08-08 RX ORDER — HYDROCODONE BITARTRATE AND ACETAMINOPHEN 10; 325 MG/1; MG/1
0.5 TABLET ORAL EVERY 6 HOURS PRN
Qty: 4 TABLET | Refills: 0 | Status: SHIPPED | OUTPATIENT
Start: 2024-08-08

## 2024-08-08 NOTE — ED INITIAL ASSESSMENT (HPI)
Pt reports left abdominal pain that started this morning. Pt reports N/V and chills that started this morning. Pt reports 5/10 pain.

## 2024-08-08 NOTE — ED PROVIDER NOTES
Patient Seen in: [facility] Emergency Department    History     Chief Complaint   Patient presents with    Abdomen/Flank Pain    Nausea/Vomiting/Diarrhea       HPI    64-year-old female who since this morning has been having left flank intermittent pain.  She denies any emesis or fevers or any urinary symptoms.  Pain is moderate.  No chest pain or dyspnea    History reviewed.   Past Medical History:    Acid reflux    Asthma (HCC)    Esophageal reflux    Essential hypertension    High blood pressure    High cholesterol    Hyperlipidemia    Osteoarthritis    Rheumatoid arthritis (HCC)    Vertigo    Visual impairment    wears glasses       History reviewed.   Past Surgical History:   Procedure Laterality Date    Excisional biopsy left  1999/2000    benign lump removed    Tonsillectomy      Total knee replacement Bilateral 2019         Medications :  (Not in a hospital admission)       Family History   Problem Relation Age of Onset    Stroke Father     Diabetes Mother     Stroke Paternal Grandmother        Smoking Status:   Social History     Socioeconomic History    Marital status:    Tobacco Use    Smoking status: Never     Passive exposure: Past    Smokeless tobacco: Never   Vaping Use    Vaping status: Never Used   Substance and Sexual Activity    Alcohol use: Yes     Comment: occ    Drug use: No       Constitutional and vital signs reviewed.      Social History and Family History elements reviewed from today, pertinent positives to the presenting problem noted.    Physical Exam     ED Triage Vitals [08/08/24 0759]   BP (!) 162/73   Pulse 52   Resp 20   Temp 97.6 °F (36.4 °C)   Temp src Temporal   SpO2 99 %   O2 Device None (Room air)       All measures to prevent infection transmission during my interaction with the patient were taken. The patient was already wearing a droplet mask on my arrival to the room. Personal protective equipment was worn throughout the duration of the exam.  Handwashing was  performed prior to and after the exam.  Stethoscope and any equipment used during my examination was cleaned with super sani-cloth germicidal wipes following the exam.     Physical Exam    General: NAD  Head: Normocephalic and atraumatic.  Mouth/Throat/Ears/Nose: Oropharynx is clear and moist.   Eyes: Conjunctivae and EOM are normal.   Neck: Normal range of motion. Supple.   Cardiovascular: Normal rate, regular rhythm, normal heart sounds.  Respiratory/Chest: Clear and equal bilaterally. Exhibits no tenderness.  Gastrointestinal: Soft, LLQ/L CVA ttp, non-distended. Bowel sounds are normal.   Musculoskeletal:No swelling or deformity.   Neurological: Alert and appropriate. No focal deficits.   Skin: Skin is warm and dry. No pallor.  Psychiatric: Has a normal mood and affect.      ED Course        Labs Reviewed   URINALYSIS WITH CULTURE REFLEX - Abnormal; Notable for the following components:       Result Value    Ketones Urine 40 (*)     Blood Urine 3+ (*)     Protein Urine 20 (*)     Leukocyte Esterase Urine 25 (*)     WBC Urine 6-10 (*)     RBC Urine >10 (*)     Squamous Epi. Cells Few (*)     All other components within normal limits   CBC WITH DIFFERENTIAL WITH PLATELET - Abnormal; Notable for the following components:    WBC 13.0 (*)     Neutrophil Absolute Prelim 11.84 (*)     Neutrophil Absolute 11.84 (*)     Lymphocyte Absolute 0.74 (*)     All other components within normal limits   COMP METABOLIC PANEL (14) - Abnormal; Notable for the following components:    Glucose 141 (*)     Potassium 3.1 (*)     BUN/CREA Ratio 20.4 (*)     Calculated Osmolality 297 (*)     All other components within normal limits   URINE CULTURE, ROUTINE - Abnormal; Notable for the following components:    Urine Culture 10,000 - 50,000 CFU/ML Escherichia coli (*)     Urine Culture 10,000 - 50,000 CFU/ML Morganella morganii (*)     All other components within normal limits   LIPASE - Normal       As Interpreted by me    Imaging Results  Available and Reviewed while in ED: No results found.  ED Medications Administered:   Medications   ondansetron (Zofran) 4 MG/2ML injection 4 mg (4 mg Intravenous Given 8/8/24 1033)   sodium chloride 0.9 % IV bolus 1,000 mL (0 mL Intravenous Stopped 8/8/24 1256)   ketorolac (Toradol) 15 MG/ML injection 15 mg (15 mg Intravenous Given 8/8/24 1033)   iopamidol 76% (ISOVUE-370) injection for power injector (80 mL Intravenous Given 8/8/24 1151)         MDM     Vitals:    08/08/24 0759 08/08/24 1245   BP: (!) 162/73 116/78   Pulse: 52 65   Resp: 20 16   Temp: 97.6 °F (36.4 °C)    TempSrc: Temporal    SpO2: 99% 94%   Weight: 99.8 kg    Height: 162.6 cm (5' 4\")      *I personally reviewed and interpreted all ED vitals.    Pulse Ox: 99%, Room air, Normal     Monitor Interpretation:   normal sinus rhythm as interpreted by me.  The cardiac monitor was ordered given concern for electrolyte derangements.      Medical Decision Making      Differential Diagnosis/ Diagnostic Considerations: Ureteral stone, diverticulitis    Complicating Factors: The patient already has HTN to contribute to the complexity of this ED evaluation.    I reviewed prior chart records including discharge summary from February of this year.  Patient here with left ureteral stone, punctate size, mild hydronephrosis.  Agree with report.  Labs reviewed and notable for microscopic hematuria as expected for ureteral stone.    Considered admission however the patient is tolerating p.o., pain is controlled.  No infectious symptoms.  Discharged to home in stable condition with urology referral provided.     Patient is comfortable with the plan.    Prescriptions: As below    Collateral information also obtained from the  who is also comfortable with the plan      Disposition and Plan     Clinical Impression:  1. Left ureteral stone        Disposition:  Discharge    Follow-up:  Scott Prieto MD  [address] [place] [address]  [address] [place] [address]  [id number]  [phone]    Schedule an appointment as soon as possible for a visit in 1 day(s)      Jone Sharpe DO  [address] [facility]  [address]  [facility] [address]  [phone]    Schedule an appointment as soon as possible for a visit in 1 day(s)      Jone Sharpe DO  [address] [facility] [address]  [facility] [address]  [phone]            Medications Prescribed:  Discharge Medication List as of 8/8/2024 12:57 PM        START taking these medications    Details   ondansetron 8 MG Oral Tablet Dispersible Take 1 tablet (8 mg total) by mouth every 4 (four) hours as needed for Nausea., Normal, Disp-10 tablet, R-0      HYDROcodone-acetaminophen  MG Oral Tab Take 0.5 tablets by mouth every 6 (six) hours as needed for Pain., Normal, Disp-4 tablet, R-0

## 2024-08-10 RX ORDER — SULFAMETHOXAZOLE AND TRIMETHOPRIM 800; 160 MG/1; MG/1
1 TABLET ORAL 2 TIMES DAILY
Qty: 20 TABLET | Refills: 0 | Status: SHIPPED | OUTPATIENT
Start: 2024-08-10 | End: 2024-08-20

## 2024-08-10 NOTE — PROGRESS NOTES
ED Culture Callback Results Review    Pharmacist reviewed culture results from ED visit .    Final urine culture positive for untreated E. Coli and M. morganii.    A new prescription for bactrim was electronically sent to [facility] as discussed with Dr. Corey. The patient was contacted by phone, informed of the results, and educated regarding the new therapy. The patient verbalized understanding of the treatment plan and all questions were answered.    Aris Rivero PharmD   Emergency Medicine Pharmacist Specialist  08/10/24; 2:44 PM

## 2024-08-30 ENCOUNTER — HOSPITAL ENCOUNTER (OUTPATIENT)
Dept: CT IMAGING | Facility: HOSPITAL | Age: 64
Discharge: HOME | End: 2024-08-30
Attending: UROLOGY
Payer: MEDICARE

## 2024-08-30 DIAGNOSIS — N20.0 KIDNEY STONE: ICD-10-CM

## 2024-08-30 PROCEDURE — 74176 CT ABD & PELVIS W/O CONTRAST: CPT | Performed by: UROLOGY

## 2025-01-16 ENCOUNTER — OFFICE VISIT (OUTPATIENT)
Dept: INTERNAL MEDICINE CLINIC | Facility: CLINIC | Age: 65
End: 2025-01-16

## 2025-01-16 ENCOUNTER — TELEPHONE (OUTPATIENT)
Dept: FAMILY MEDICINE CLINIC | Facility: CLINIC | Age: 65
End: 2025-01-16

## 2025-01-16 ENCOUNTER — TELEPHONE (OUTPATIENT)
Dept: INTERNAL MEDICINE CLINIC | Facility: CLINIC | Age: 65
End: 2025-01-16

## 2025-01-16 ENCOUNTER — LAB ENCOUNTER (OUTPATIENT)
Dept: LAB | Age: 65
End: 2025-01-16
Attending: NURSE PRACTITIONER
Payer: MEDICARE

## 2025-01-16 VITALS
BODY MASS INDEX: 37.41 KG/M2 | DIASTOLIC BLOOD PRESSURE: 74 MMHG | RESPIRATION RATE: 16 BRPM | HEIGHT: 64 IN | HEART RATE: 60 BPM | TEMPERATURE: 99 F | OXYGEN SATURATION: 99 % | WEIGHT: 219.13 LBS | SYSTOLIC BLOOD PRESSURE: 160 MMHG

## 2025-01-16 DIAGNOSIS — J45.21 MILD INTERMITTENT ASTHMA WITH EXACERBATION (HCC): ICD-10-CM

## 2025-01-16 DIAGNOSIS — I10 PRIMARY HYPERTENSION: ICD-10-CM

## 2025-01-16 DIAGNOSIS — Z12.11 COLON CANCER SCREENING: ICD-10-CM

## 2025-01-16 DIAGNOSIS — E66.9 OBESITY (BMI 30-39.9): ICD-10-CM

## 2025-01-16 DIAGNOSIS — M72.2 PLANTAR FASCIITIS, BILATERAL: ICD-10-CM

## 2025-01-16 DIAGNOSIS — T84.093A FAILED TOTAL LEFT KNEE REPLACEMENT, INITIAL ENCOUNTER (HCC): ICD-10-CM

## 2025-01-16 DIAGNOSIS — R73.03 PREDIABETES: ICD-10-CM

## 2025-01-16 DIAGNOSIS — I89.0 LYMPHEDEMA: ICD-10-CM

## 2025-01-16 DIAGNOSIS — E66.812 CLASS 2 OBESITY DUE TO EXCESS CALORIES WITHOUT SERIOUS COMORBIDITY WITH BODY MASS INDEX (BMI) OF 38.0 TO 38.9 IN ADULT: ICD-10-CM

## 2025-01-16 DIAGNOSIS — E78.5 HYPERLIPIDEMIA, UNSPECIFIED HYPERLIPIDEMIA TYPE: ICD-10-CM

## 2025-01-16 DIAGNOSIS — J45.20 MILD INTERMITTENT ASTHMA WITHOUT COMPLICATION (HCC): ICD-10-CM

## 2025-01-16 DIAGNOSIS — Z00.00 ENCOUNTER FOR ANNUAL HEALTH EXAMINATION: ICD-10-CM

## 2025-01-16 DIAGNOSIS — Z12.31 ENCOUNTER FOR SCREENING MAMMOGRAM FOR MALIGNANT NEOPLASM OF BREAST: ICD-10-CM

## 2025-01-16 DIAGNOSIS — Z00.00 ENCOUNTER FOR ANNUAL HEALTH EXAMINATION: Primary | ICD-10-CM

## 2025-01-16 DIAGNOSIS — Z78.0 POST-MENOPAUSAL: ICD-10-CM

## 2025-01-16 DIAGNOSIS — M17.11 OSTEOARTHRITIS OF RIGHT KNEE, UNSPECIFIED OSTEOARTHRITIS TYPE: ICD-10-CM

## 2025-01-16 DIAGNOSIS — E66.09 CLASS 2 OBESITY DUE TO EXCESS CALORIES WITHOUT SERIOUS COMORBIDITY WITH BODY MASS INDEX (BMI) OF 38.0 TO 38.9 IN ADULT: ICD-10-CM

## 2025-01-16 PROBLEM — J45.909 ASTHMA (HCC): Status: ACTIVE | Noted: 2025-01-16

## 2025-01-16 LAB
ALBUMIN SERPL-MCNC: 4.9 G/DL (ref 3.2–4.8)
ALBUMIN/GLOB SERPL: 1.9 {RATIO} (ref 1–2)
ALP LIVER SERPL-CCNC: 88 U/L
ALT SERPL-CCNC: 21 U/L
ANION GAP SERPL CALC-SCNC: 9 MMOL/L (ref 0–18)
AST SERPL-CCNC: 27 U/L (ref ?–34)
BASOPHILS # BLD AUTO: 0.05 X10(3) UL (ref 0–0.2)
BASOPHILS NFR BLD AUTO: 0.5 %
BILIRUB SERPL-MCNC: 0.5 MG/DL (ref 0.2–1.1)
BUN BLD-MCNC: 17 MG/DL (ref 9–23)
BUN/CREAT SERPL: 20.5 (ref 10–20)
CALCIUM BLD-MCNC: 10.5 MG/DL (ref 8.7–10.4)
CHLORIDE SERPL-SCNC: 103 MMOL/L (ref 98–112)
CHOLEST SERPL-MCNC: 170 MG/DL (ref ?–200)
CO2 SERPL-SCNC: 32 MMOL/L (ref 21–32)
CREAT BLD-MCNC: 0.83 MG/DL
DEPRECATED RDW RBC AUTO: 44.1 FL (ref 35.1–46.3)
EGFRCR SERPLBLD CKD-EPI 2021: 78 ML/MIN/1.73M2 (ref 60–?)
EOSINOPHIL # BLD AUTO: 0.12 X10(3) UL (ref 0–0.7)
EOSINOPHIL NFR BLD AUTO: 1.2 %
ERYTHROCYTE [DISTWIDTH] IN BLOOD BY AUTOMATED COUNT: 13.6 % (ref 11–15)
EST. AVERAGE GLUCOSE BLD GHB EST-MCNC: 131 MG/DL (ref 68–126)
FASTING PATIENT LIPID ANSWER: NO
FASTING STATUS PATIENT QL REPORTED: NO
GLOBULIN PLAS-MCNC: 2.6 G/DL (ref 2–3.5)
GLUCOSE BLD-MCNC: 79 MG/DL (ref 70–99)
HBA1C MFR BLD: 6.2 % (ref ?–5.7)
HCT VFR BLD AUTO: 39.5 %
HDLC SERPL-MCNC: 72 MG/DL (ref 40–59)
HGB BLD-MCNC: 13.2 G/DL
IMM GRANULOCYTES # BLD AUTO: 0.03 X10(3) UL (ref 0–1)
IMM GRANULOCYTES NFR BLD: 0.3 %
LDLC SERPL CALC-MCNC: 88 MG/DL (ref ?–100)
LYMPHOCYTES # BLD AUTO: 1.5 X10(3) UL (ref 1–4)
LYMPHOCYTES NFR BLD AUTO: 14.8 %
MCH RBC QN AUTO: 29.3 PG (ref 26–34)
MCHC RBC AUTO-ENTMCNC: 33.4 G/DL (ref 31–37)
MCV RBC AUTO: 87.8 FL
MONOCYTES # BLD AUTO: 0.63 X10(3) UL (ref 0.1–1)
MONOCYTES NFR BLD AUTO: 6.2 %
NEUTROPHILS # BLD AUTO: 7.79 X10 (3) UL (ref 1.5–7.7)
NEUTROPHILS # BLD AUTO: 7.79 X10(3) UL (ref 1.5–7.7)
NEUTROPHILS NFR BLD AUTO: 77 %
NONHDLC SERPL-MCNC: 98 MG/DL (ref ?–130)
OSMOLALITY SERPL CALC.SUM OF ELEC: 298 MOSM/KG (ref 275–295)
PLATELET # BLD AUTO: 267 10(3)UL (ref 150–450)
POTASSIUM SERPL-SCNC: 3.4 MMOL/L (ref 3.5–5.1)
PROT SERPL-MCNC: 7.5 G/DL (ref 5.7–8.2)
RBC # BLD AUTO: 4.5 X10(6)UL
SODIUM SERPL-SCNC: 144 MMOL/L (ref 136–145)
TRIGL SERPL-MCNC: 51 MG/DL (ref 30–149)
TSI SER-ACNC: 0.93 UIU/ML (ref 0.55–4.78)
VLDLC SERPL CALC-MCNC: 8 MG/DL (ref 0–30)
WBC # BLD AUTO: 10.1 X10(3) UL (ref 4–11)

## 2025-01-16 PROCEDURE — 84443 ASSAY THYROID STIM HORMONE: CPT

## 2025-01-16 PROCEDURE — 99203 OFFICE O/P NEW LOW 30 MIN: CPT | Performed by: NURSE PRACTITIONER

## 2025-01-16 PROCEDURE — G0402 INITIAL PREVENTIVE EXAM: HCPCS | Performed by: NURSE PRACTITIONER

## 2025-01-16 PROCEDURE — 85025 COMPLETE CBC W/AUTO DIFF WBC: CPT

## 2025-01-16 PROCEDURE — 36415 COLL VENOUS BLD VENIPUNCTURE: CPT

## 2025-01-16 PROCEDURE — 80053 COMPREHEN METABOLIC PANEL: CPT

## 2025-01-16 PROCEDURE — 99499 UNLISTED E&M SERVICE: CPT | Performed by: NURSE PRACTITIONER

## 2025-01-16 PROCEDURE — 83036 HEMOGLOBIN GLYCOSYLATED A1C: CPT

## 2025-01-16 PROCEDURE — 80061 LIPID PANEL: CPT

## 2025-01-16 RX ORDER — PREDNISONE 20 MG/1
20 TABLET ORAL 2 TIMES DAILY
Qty: 10 TABLET | Refills: 0 | Status: SHIPPED | OUTPATIENT
Start: 2025-01-16 | End: 2025-01-21

## 2025-01-16 RX ORDER — ALBUTEROL SULFATE 90 UG/1
2 INHALANT RESPIRATORY (INHALATION) EVERY 6 HOURS
Qty: 1 EACH | Refills: 6 | Status: SHIPPED | OUTPATIENT
Start: 2025-01-16

## 2025-01-16 RX ORDER — SULFAMETHOXAZOLE AND TRIMETHOPRIM 800; 160 MG/1; MG/1
1 TABLET ORAL 2 TIMES DAILY
COMMUNITY
End: 2025-01-16

## 2025-01-16 RX ORDER — ROSUVASTATIN CALCIUM 10 MG/1
10 TABLET, COATED ORAL DAILY
Qty: 90 TABLET | Refills: 3 | Status: SHIPPED | OUTPATIENT
Start: 2025-01-16

## 2025-01-16 RX ORDER — HYDROCHLOROTHIAZIDE 25 MG/1
25 TABLET ORAL DAILY
Qty: 90 TABLET | Refills: 3 | Status: SHIPPED | OUTPATIENT
Start: 2025-01-16

## 2025-01-16 NOTE — TELEPHONE ENCOUNTER
Mila Brownlee,      As per Dr. Wood's instructions, please schedule an appointment for a blood pressure check-up. Contact the [place] office at [phone]. Thank you!  Message Via My Chart.

## 2025-01-16 NOTE — PROGRESS NOTES
Subjective:   Torrie Smith is a 64 year old female who presents for a Medicare Initial Preventative Physical Exam (Welcome to Medicare- < 12 months on Medicare) and scheduled follow up of multiple significant but stable problems and acute exacerbation of a chronic illness.   New to me  Welcome to medicare visit  Age 39 diagnosed with asthma. Using albuterol PRN, using once every couple of months, mostly with weather change. Denies any CP, SOB, HA, dizziness, wheezing    Reports cough and nasal congestion for several weeks. No fatigue, sinus pressure or pain, CP, dizziness, orthopnea. Denies any wheezing but cough is persistent (mild). No pain. Denies any fever, chills, covid testing not reported.     HTN: dx 2019. On hydrochlorothiazide 25 mg daily.     HLD: on rosvastatin.     Rheumatoid arthritis/osteoarthritis    History/Other:   Fall Risk Assessment:   She has been screened for Falls and is High Risk. Fall Prevention information provided to patient in After Visit Summary.    Have you fallen in the last 12 months?: 0-No  Fall/Risk Scorin  Do you feel unsteady when standing or walking?: No  Do you worry about falling?: Yes  Have you fallen in the past year?: No     Cognitive Assessment:   She had a completely normal cognitive assessment - see flowsheet entries       Functional Ability/Status:   Torrie Smith has some abnormal functions as listed below:  She has Vision problems based on screening of functional status. She has Walking problems based on screening of functional status.     Chronic lymphedema contributes to gait problem, she will f/u with lymphedema clinic.     Seeing optometry yearly.     Depression Screening (PHQ):  PHQ-2 SCORE: 0  , done 2025   If you checked off any problems, how difficult have these problems made it for you to do your work, take care of things at home, or get along with other people?: Not difficult at all           Advanced Directives:   She does NOT have a Living Will.  [Do you have a living will?: No]  She does NOT have a Power of  for Health Care. [Do you have a healthcare power of ?: No]  Discussed Advance Care Planning with patient (and family/surrogate if present). Standard forms made available to patient in After Visit Summary.      Patient Active Problem List   Diagnosis    Hyperlipidemia, unspecified hyperlipidemia type    Hypertension    Class 2 obesity due to excess calories without serious comorbidity with body mass index (BMI) of 38.0 to 38.9 in adult    Bilateral primary osteoarthritis of knee    Lymphedema    Prediabetes    Mild intermittent asthma without complication (HCC)    Plantar fasciitis, bilateral    Failed total left knee replacement (HCC)    Preop testing    Asthma (HCC)    Obesity (BMI 30-39.9)    Osteoarthritis of right knee    Post-menopausal    Mild intermittent asthma with exacerbation (HCC)     Allergies:  She is allergic to atorvastatin and gabapentin.    Current Medications:  Outpatient Medications Marked as Taking for the 1/16/25 encounter (Office Visit) with Florence Wood APRN   Medication Sig    acetaminophen (TYLENOL EXTRA STRENGTH) 500 MG Oral Tab Take 2 tablet(s) 4 times daily by oral route for 4 weeks after surgery    diclofenac 75 MG Oral Tab EC Take 1 tablet (75 mg total) by mouth 2 (two) times daily as needed.    predniSONE 20 MG Oral Tab Take 1 tablet (20 mg total) by mouth 2 (two) times daily for 5 days.    rosuvastatin 10 MG Oral Tab Take 1 tablet (10 mg total) by mouth daily.    hydroCHLOROthiazide 25 MG Oral Tab Take 1 tablet (25 mg total) by mouth daily.    albuterol 108 (90 Base) MCG/ACT Inhalation Aero Soln Inhale 2 puffs into the lungs every 6 (six) hours.    aspirin 81 MG Oral Tab EC Take 1 tablet (81 mg total) by mouth in the morning and 1 tablet (81 mg total) before bedtime.       Medical History:  She  has a past medical history of Acid reflux, Asthma (HCC), Esophageal reflux, Essential hypertension, High  blood pressure, High cholesterol, Hyperlipidemia, Osteoarthritis, Rheumatoid arthritis (HCC), Vertigo, and Visual impairment.  Surgical History:  She  has a past surgical history that includes tonsillectomy; excisional biopsy left (1999/2000); and total knee replacement (Bilateral, 2019).   Family History:  Her family history includes Diabetes in her mother; Stroke in her father and paternal grandmother.  Social History:  She  reports that she has never smoked. She has been exposed to tobacco smoke. She has never used smokeless tobacco. She reports current alcohol use. She reports that she does not use drugs.    Tobacco:       CAGE Alcohol Screen:   CAGE screening score of 0 on 1/16/2025, showing low risk of alcohol abuse.      Patient Care Team:  Scott Prieto MD as PCP - General (Internal Medicine)    Review of Systems   Constitutional:  Negative for activity change, appetite change, fatigue, fever and unexpected weight change.   HENT:  Negative for congestion, dental problem and sore throat.    Eyes:  Negative for visual disturbance.   Respiratory:  Negative for cough, chest tightness, shortness of breath and wheezing.    Cardiovascular:  Negative for chest pain, palpitations and leg swelling.   Gastrointestinal:  Negative for abdominal pain, constipation, diarrhea, nausea and vomiting.   Endocrine: Negative.    Genitourinary:  Negative for difficulty urinating, frequency and menstrual problem.   Musculoskeletal:  Positive for arthralgias. Negative for back pain.   Skin:  Negative for color change, pallor, rash and wound.   Neurological:  Negative for dizziness, seizures, light-headedness, numbness and headaches.   Psychiatric/Behavioral:  Negative for behavioral problems, dysphoric mood and suicidal ideas. The patient is not nervous/anxious.        Objective:   Physical Exam  Vitals reviewed.   Constitutional:       General: She is not in acute distress.     Appearance: Normal appearance. She is obese.  She is not ill-appearing.   HENT:      Head: Normocephalic and atraumatic.      Right Ear: Tympanic membrane, ear canal and external ear normal.      Left Ear: Tympanic membrane, ear canal and external ear normal.      Nose: Nose normal.      Mouth/Throat:      Pharynx: Oropharynx is clear.   Eyes:      General: No scleral icterus.        Right eye: No discharge.         Left eye: No discharge.      Extraocular Movements: Extraocular movements intact.      Conjunctiva/sclera: Conjunctivae normal.      Pupils: Pupils are equal, round, and reactive to light.   Neck:      Thyroid: No thyroid mass or thyromegaly.   Cardiovascular:      Rate and Rhythm: Normal rate and regular rhythm.      Pulses: Normal pulses.      Heart sounds: Normal heart sounds.   Pulmonary:      Effort: Pulmonary effort is normal. No respiratory distress.      Breath sounds: Normal breath sounds.   Abdominal:      General: Abdomen is flat. Bowel sounds are normal. There is no distension.      Palpations: Abdomen is soft. There is no mass.      Tenderness: There is no abdominal tenderness.   Musculoskeletal:         General: Normal range of motion.      Cervical back: Normal range of motion and neck supple.      Right lower leg: No edema.      Left lower leg: No edema.   Lymphadenopathy:      Cervical: No cervical adenopathy.   Skin:     General: Skin is warm and dry.      Coloration: Skin is not jaundiced.   Neurological:      General: No focal deficit present.      Mental Status: She is alert and oriented to person, place, and time.      Motor: Motor function is intact.      Gait: Gait normal.   Psychiatric:         Mood and Affect: Mood normal.         Judgment: Judgment normal.           /74 (BP Location: Left arm, Patient Position: Sitting, Cuff Size: large)   Pulse 60   Temp 99.3 °F (37.4 °C) (Tympanic)   Resp 16   Ht 5' 4\" (1.626 m)   Wt 219 lb 1.6 oz (99.4 kg)   LMP 12/29/2015   SpO2 99%   BMI 37.61 kg/m²  Estimated body mass  index is 37.61 kg/m² as calculated from the following:    Height as of this encounter: 5' 4\" (1.626 m).    Weight as of this encounter: 219 lb 1.6 oz (99.4 kg).    Medicare Hearing Assessment:   Hearing Screening    Time taken: 1/16/2025  3:39 PM  Screening Method: Finger Rub  Finger Rub Result: Pass         Visual Acuity:   Right Eye Visual Acuity: Corrected Right Eye Chart Acuity: 20/25   Left Eye Visual Acuity: Corrected Left Eye Chart Acuity: 20/25   Both Eyes Visual Acuity: Corrected Both Eyes Chart Acuity: 20/25   Able To Tolerate Visual Acuity: Yes        Assessment & Plan:   Torrie Smith is a 64 year old female who presents for a Medicare Assessment.     1. Encounter for annual health examination (Primary)  Education provided on healthy lifestyle.  Diet: reduce saturated fats, simple carbs and excess sugars. Hydrate. Leafy greens, legumes, nuts/seeds, healthy sources of Omega 3, lean proteins, complex carbs, berries.   Exercise 30 min daily cardio as tolerated.   Immunizations reviewed and recommendations provided  Preventative health screening recommendations reviewed.   Previous lab and imaging results reviewed.  -     CBC With Differential With Platelet; Future; Expected date: 01/16/2025  2. Primary hypertension  -Check labs, check BP twice daily and keep log, f/u in 1-2 weeks with readings. If remains elevated, consider additional BP lowering agent.   -DASH, hydrate, exercise.   -     Comp Metabolic Panel (14); Future; Expected date: 01/16/2025  -     hydroCHLOROthiazide; Take 1 tablet (25 mg total) by mouth daily.  Dispense: 90 tablet; Refill: 3  3. Hyperlipidemia, unspecified hyperlipidemia type  -refilled statin, check labs, heart healthy diet advised.   -     Comp Metabolic Panel (14); Future; Expected date: 01/16/2025  -     TSH W Reflex To Free T4; Future; Expected date: 01/16/2025  -     Lipid Panel; Future; Expected date: 01/16/2025  -     Rosuvastatin Calcium; Take 1 tablet (10 mg total) by  mouth daily.  Dispense: 90 tablet; Refill: 3  4. Mild intermittent asthma without complication (HCC)  -Start steroid burst, reviewed use.   -Continue albuterol PRN. Refill provided.   -Reviewed alarm signs. When to go to ER.   -     Albuterol Sulfate HFA; Inhale 2 puffs into the lungs every 6 (six) hours.  Dispense: 1 each; Refill: 6  5. Prediabetes  -check A1c.   -     Comp Metabolic Panel (14); Future; Expected date: 01/16/2025  -     Hemoglobin A1C; Future; Expected date: 01/16/2025  6. Class 2 obesity due to excess calories without serious comorbidity with body mass index (BMI) of 38.0 to 38.9 in adult  -Education provided on [facility] healthy lifestyle and weight.  7. Obesity (BMI 30-39.9)  -Education provided on [facility] healthy lifestyle and weight.  8. Plantar fasciitis, bilateral  -Stable, CPM  9. Osteoarthritis of right knee, unspecified osteoarthritis type  -Stable, CPM  10. Failed total left knee replacement, initial encounter (HCC)  -Stable, CPM  11. Mild intermittent asthma with exacerbation (HCC)  -Stable, CPM  12. Colon cancer screening  -declines  13. Encounter for screening mammogram for malignant neoplasm of breast  -     [facility] MALAIKA 2D+3D SCREENING BILAT (CPT=77067/05990); Future; Expected date: 01/16/2025  14. Post-menopausal  -     XR DEXA BONE DENSITOMETRY (CPT=77080); Future; Expected date: 01/16/2025  15. Lymphedema  -Pt to follow with lymphedema clinic.   Other orders  -     predniSONE; Take 1 tablet (20 mg total) by mouth 2 (two) times daily for 5 days.  Dispense: 10 tablet; Refill: 0  The patient indicates understanding of these issues and agrees to the plan.  Continue with current treatment plan.  Further testing ordered.  Imaging studies ordered.  Lab work ordered.  Reinforced healthy diet, lifestyle, and exercise.      Return in 6 months (on 7/16/2025).     ANA Kelley, 1/16/2025     Supplementary Documentation:   General Health:  In the past six months, have you lost more than 10  pounds without trying?: 2 - No  Has your appetite been poor?: No  Type of Diet: Balanced  How does the patient maintain a good energy level?: Appropriate Exercise  How would you describe your daily physical activity?: Light  How would you describe your current health state?: Good  How do you maintain positive mental well-being?: Social Interaction;Visiting Family  Have you had any immunizations at another office such as Influenza, Hepatitis B, Tetanus, or Pneumococcal?: No    Health Maintenance   Topic Date Due    Colorectal Cancer Screening  Never done    Annual Physical  Never done    Pneumococcal Vaccine: 50+ Years (3 of 3 - PCV20 or PCV21) 01/01/2014    Pap Smear  06/27/2019    Zoster Vaccines (2 of 2) 07/09/2019    Mammogram  05/21/2022    DEXA Scan  Never done    COVID-19 Vaccine (4 - 2024-25 season) 09/01/2024    Influenza Vaccine (1) 10/01/2024    Annual Depression Screening  01/01/2025    Fall Risk Screening (Annual)  01/01/2025    Meningococcal B Vaccine  Aged Out

## 2025-01-20 DIAGNOSIS — E87.6 HYPOKALEMIA: Primary | ICD-10-CM

## 2025-01-23 ENCOUNTER — TELEPHONE (OUTPATIENT)
Dept: INTERNAL MEDICINE CLINIC | Facility: CLINIC | Age: 65
End: 2025-01-23

## 2025-01-23 NOTE — TELEPHONE ENCOUNTER
----- Message from Florence Wood sent at 1/23/2025 11:53 AM CST -----  Please inform patient of results, not checking mychart.

## 2025-02-07 ENCOUNTER — LAB ENCOUNTER (OUTPATIENT)
Dept: LAB | Age: 65
End: 2025-02-07
Attending: NURSE PRACTITIONER
Payer: MEDICARE

## 2025-02-07 DIAGNOSIS — E87.6 HYPOKALEMIA: ICD-10-CM

## 2025-02-07 LAB
ALBUMIN SERPL-MCNC: 4.5 G/DL (ref 3.2–4.8)
ALBUMIN/GLOB SERPL: 1.7 {RATIO} (ref 1–2)
ALP LIVER SERPL-CCNC: 86 U/L
ALT SERPL-CCNC: 14 U/L
ANION GAP SERPL CALC-SCNC: 7 MMOL/L (ref 0–18)
AST SERPL-CCNC: 19 U/L (ref ?–34)
BILIRUB SERPL-MCNC: 0.5 MG/DL (ref 0.2–1.1)
BUN BLD-MCNC: 19 MG/DL (ref 9–23)
BUN/CREAT SERPL: 21.1 (ref 10–20)
CALCIUM BLD-MCNC: 9.4 MG/DL (ref 8.7–10.4)
CHLORIDE SERPL-SCNC: 103 MMOL/L (ref 98–112)
CO2 SERPL-SCNC: 32 MMOL/L (ref 21–32)
CREAT BLD-MCNC: 0.9 MG/DL
EGFRCR SERPLBLD CKD-EPI 2021: 71 ML/MIN/1.73M2 (ref 60–?)
FASTING STATUS PATIENT QL REPORTED: YES
GLOBULIN PLAS-MCNC: 2.6 G/DL (ref 2–3.5)
GLUCOSE BLD-MCNC: 86 MG/DL (ref 70–99)
OSMOLALITY SERPL CALC.SUM OF ELEC: 296 MOSM/KG (ref 275–295)
POTASSIUM SERPL-SCNC: 3.7 MMOL/L (ref 3.5–5.1)
PROT SERPL-MCNC: 7.1 G/DL (ref 5.7–8.2)
SODIUM SERPL-SCNC: 142 MMOL/L (ref 136–145)

## 2025-02-07 PROCEDURE — 36415 COLL VENOUS BLD VENIPUNCTURE: CPT

## 2025-02-07 PROCEDURE — 80053 COMPREHEN METABOLIC PANEL: CPT

## 2025-05-06 ENCOUNTER — TELEPHONE (OUTPATIENT)
Dept: INTERNAL MEDICINE CLINIC | Facility: CLINIC | Age: 65
End: 2025-05-06

## 2025-05-06 DIAGNOSIS — I89.0 LYMPHEDEMA: Primary | ICD-10-CM

## 2025-05-06 NOTE — TELEPHONE ENCOUNTER
Per patient she needs Compression pump for her leg and send the Order to [employer] [facility] Tel# [phone] Attn: Herminia

## 2025-05-15 NOTE — TELEPHONE ENCOUNTER
Triage support received Lymphedema DME forms that need to be signed by ANA Miranda.    Faxing to onsite staff at [phone].

## 2025-05-15 NOTE — TELEPHONE ENCOUNTER
Called Fermin from [facility] who stated he has been faxing the referral form to Doctors office but hasn't heard back, Asked Fermin to fax it to [phone] as it seems he may have been faxing it to the wrong office, He stated he would like a call back once the fax is received, MD does need to sign the form.     Fermin asked that patients last office notes must be from the last 6 months for pt to be covered and to attach the office notes to the form as well.     Please call Fermin If forms received.

## 2025-07-19 ENCOUNTER — HOSPITAL ENCOUNTER (OUTPATIENT)
Dept: BONE DENSITY | Age: 65
Discharge: HOME | End: 2025-07-19
Attending: NURSE PRACTITIONER
Payer: MEDICARE

## 2025-07-19 ENCOUNTER — HOSPITAL ENCOUNTER (OUTPATIENT)
Dept: MAMMOGRAPHY | Age: 65
Discharge: HOME | End: 2025-07-19
Attending: NURSE PRACTITIONER
Payer: MEDICARE

## 2025-07-19 DIAGNOSIS — Z78.0 POST-MENOPAUSAL: ICD-10-CM

## 2025-07-19 DIAGNOSIS — Z12.31 ENCOUNTER FOR SCREENING MAMMOGRAM FOR MALIGNANT NEOPLASM OF BREAST: ICD-10-CM

## 2025-07-19 PROCEDURE — 77080 DXA BONE DENSITY AXIAL: CPT | Performed by: NURSE PRACTITIONER

## 2025-07-19 PROCEDURE — 77063 BREAST TOMOSYNTHESIS BI: CPT | Performed by: NURSE PRACTITIONER

## 2025-07-19 PROCEDURE — 77067 SCR MAMMO BI INCL CAD: CPT | Performed by: NURSE PRACTITIONER

## (undated) DEVICE — APPLICATOR SKIN PREP 26ML HI LT ORNG 2% CHG

## (undated) DEVICE — TRAY CATH 16FR F INCLUDE BARDX IC COMPLT CARE

## (undated) DEVICE — PACK CDS TOTAL KNEE

## (undated) DEVICE — HOOD STERI-SHIELD 408-800-000

## (undated) DEVICE — SUTURE VCRL SZ 2-0 L27IN ABSRB UD L26MM CT-2

## (undated) DEVICE — SYSTEM BNE CEM MIX W/ BRKWY NOZ HI VAC PWR

## (undated) DEVICE — PIN FIX L3IN FLUT HDLSS FOR PART KNEE RESURF

## (undated) DEVICE — GOWN SURG XL DISP LEV 3 AERO BLU RAGLAN SL

## (undated) DEVICE — DRAPE SHEET LG

## (undated) DEVICE — Device

## (undated) DEVICE — SOLUTION IRRIG 1000ML 0.9% NACL USP BTL

## (undated) DEVICE — DRAPE SRG 70X60IN SPLT U IMPRV

## (undated) DEVICE — BANDAGE COHESIVE W4INXL5YD TAN E POR SLF ADH

## (undated) DEVICE — HOOD STERI-SHIELD 408-800-100

## (undated) DEVICE — CONTAINER SPEC 4OZ POLYPR GRAD SCR LID LEAK

## (undated) DEVICE — BLADE SAW 19X90X1.27G

## (undated) DEVICE — BLADE SAW W1.14XL2.17IN THK0.025IN CUT

## (undated) DEVICE — SUTURE ETHLN SZ 3-0 L30IN NONABSORB BLK

## (undated) DEVICE — KIT SPEC COLL 1ML WHT POLYPR TB W/ LIQ AMIES

## (undated) DEVICE — NEEDLE SPNL 18GA L3.5IN PNK HUB SS RW FIT

## (undated) DEVICE — TOURNIQUET CUFF 30 SINGLE

## (undated) DEVICE — DRESS WOUND AQUACEL 3.5INX10IN

## (undated) DEVICE — SYRINGE MED 20ML CLR PLAS LUERLOCK TIP DISP

## (undated) DEVICE — SUTURE VCRL SZ 0 L27IN ABSRB VLT L36MM CT-1

## (undated) DEVICE — GLOVE GAMMEX PI HYBRID LF 8.0

## (undated) DEVICE — SOLUTION IRRIG 3000ML 0.9% NACL FLX CONT

## (undated) DEVICE — DRAPE PACK CHEST

## (undated) DEVICE — NEEDLE SPINL CLR HUB 18GX3 1/2

## (undated) DEVICE — GLOVE GAMMEX PI ORTHO LF 8.5

## (undated) DEVICE — HANDPIECE IRRIG BTTRY OPERATED TYP W/ SUCT HI

## (undated) DEVICE — SUTURE QUILL SZ 2 L2IN ABSRB VLT L48MM 1/2

## (undated) DEVICE — GOWN SURG 2XL LEV 3 BLU W/ GRN NK BND AERO

## (undated) DEVICE — SOLUTION PREP 26ML 0.7% POVACRYLEX 74% ISO

## (undated) DEVICE — COVER TABLE BACK PADDED HVY DU